# Patient Record
Sex: FEMALE | Race: WHITE | NOT HISPANIC OR LATINO | Employment: PART TIME | RURAL
[De-identification: names, ages, dates, MRNs, and addresses within clinical notes are randomized per-mention and may not be internally consistent; named-entity substitution may affect disease eponyms.]

---

## 2019-06-21 RX ORDER — EPINEPHRINE 0.3 MG/.3ML
1 INJECTION SUBCUTANEOUS AS NEEDED
COMMUNITY
Start: 2015-11-23 | End: 2019-06-21 | Stop reason: SDUPTHER

## 2019-06-21 RX ORDER — EPINEPHRINE 0.3 MG/.3ML
0.3 INJECTION SUBCUTANEOUS AS NEEDED
Qty: 2 EACH | Refills: 0 | Status: SHIPPED | OUTPATIENT
Start: 2019-06-21 | End: 2022-02-01 | Stop reason: SDUPTHER

## 2019-06-27 ENCOUNTER — TELEPHONE (OUTPATIENT)
Dept: FAMILY MEDICINE CLINIC | Facility: CLINIC | Age: 64
End: 2019-06-27

## 2019-06-27 DIAGNOSIS — G89.29 CHRONIC PAIN OF LEFT KNEE: Primary | ICD-10-CM

## 2019-06-27 DIAGNOSIS — Z13.820 SCREENING FOR OSTEOPOROSIS: ICD-10-CM

## 2019-06-27 DIAGNOSIS — M25.562 CHRONIC PAIN OF LEFT KNEE: Primary | ICD-10-CM

## 2019-06-27 NOTE — TELEPHONE ENCOUNTER
Patient spoke to her insurance like you asked about the Dexa and Left knee xray. She said that insurance would pay. She would like for you to order these.

## 2019-07-02 ENCOUNTER — HOSPITAL ENCOUNTER (OUTPATIENT)
Dept: BONE DENSITY | Facility: HOSPITAL | Age: 64
Discharge: HOME | End: 2019-07-02
Admitting: NURSE PRACTITIONER

## 2019-07-02 ENCOUNTER — HOSPITAL ENCOUNTER (OUTPATIENT)
Dept: GENERAL RADIOLOGY | Facility: HOSPITAL | Age: 64
Discharge: HOME | End: 2019-07-02

## 2019-07-02 DIAGNOSIS — G89.29 CHRONIC PAIN OF LEFT KNEE: ICD-10-CM

## 2019-07-02 DIAGNOSIS — M25.562 CHRONIC PAIN OF LEFT KNEE: ICD-10-CM

## 2019-07-02 PROCEDURE — 73560 X-RAY EXAM OF KNEE 1 OR 2: CPT

## 2019-07-02 PROCEDURE — 77080 DXA BONE DENSITY AXIAL: CPT

## 2020-02-12 RX ORDER — ENALAPRIL MALEATE AND HYDROCHLOROTHIAZIDE 5; 12.5 MG/1; MG/1
TABLET ORAL
Qty: 30 TABLET | Refills: 1 | Status: SHIPPED | OUTPATIENT
Start: 2020-02-12 | End: 2020-03-24

## 2020-02-17 PROBLEM — L71.9 ROSACEA: Status: ACTIVE | Noted: 2020-02-17

## 2020-03-12 ENCOUNTER — TELEPHONE (OUTPATIENT)
Dept: FAMILY MEDICINE CLINIC | Facility: CLINIC | Age: 65
End: 2020-03-12

## 2020-03-12 RX ORDER — AMOXICILLIN AND CLAVULANATE POTASSIUM 875; 125 MG/1; MG/1
1 TABLET, FILM COATED ORAL 2 TIMES DAILY
Qty: 20 TABLET | Refills: 0 | Status: SHIPPED | OUTPATIENT
Start: 2020-03-12 | End: 2020-03-22

## 2020-03-12 NOTE — TELEPHONE ENCOUNTER
Patient has had a sinus infection for 5 days and right side ear pain. She is requesting an ABX so she dose not have to get out around sick people. Please advise?

## 2020-03-24 RX ORDER — ENALAPRIL MALEATE AND HYDROCHLOROTHIAZIDE 5; 12.5 MG/1; MG/1
TABLET ORAL
Qty: 30 TABLET | Refills: 0 | Status: SHIPPED | OUTPATIENT
Start: 2020-03-24 | End: 2020-05-11

## 2020-03-27 PROBLEM — D25.9 FIBROID UTERUS: Status: ACTIVE | Noted: 2020-03-27

## 2020-03-27 PROBLEM — J45.909 ASTHMA: Status: ACTIVE | Noted: 2018-01-19

## 2020-03-27 PROBLEM — Z87.891 HISTORY OF TOBACCO USE: Status: ACTIVE | Noted: 2020-03-27

## 2020-03-27 PROBLEM — G43.909 MIGRAINE HEADACHE: Status: ACTIVE | Noted: 2019-03-13

## 2020-03-27 PROBLEM — K21.9 GASTROESOPHAGEAL REFLUX DISEASE: Status: ACTIVE | Noted: 2020-03-27

## 2020-03-27 PROBLEM — R73.01 IMPAIRED FASTING GLUCOSE: Status: ACTIVE | Noted: 2019-03-14

## 2020-03-27 PROBLEM — J30.9 ALLERGIC RHINITIS: Status: ACTIVE | Noted: 2020-03-27

## 2020-03-27 PROBLEM — Z78.0 MENOPAUSE: Status: ACTIVE | Noted: 2020-03-27

## 2020-03-27 PROBLEM — I10 HYPERTENSION, BENIGN: Status: ACTIVE | Noted: 2020-03-27

## 2020-03-27 RX ORDER — FAMOTIDINE 40 MG/1
40 TABLET, FILM COATED ORAL NIGHTLY PRN
COMMUNITY
Start: 2018-01-11

## 2020-03-27 RX ORDER — LANSOPRAZOLE 15 MG/1
CAPSULE, DELAYED RELEASE ORAL DAILY
COMMUNITY
Start: 2013-10-03 | End: 2020-06-03

## 2020-03-27 RX ORDER — FEXOFENADINE HCL 180 MG/1
TABLET ORAL EVERY 24 HOURS
COMMUNITY
Start: 2017-12-07 | End: 2020-06-03

## 2020-03-27 RX ORDER — ALBUTEROL SULFATE 90 UG/1
AEROSOL, METERED RESPIRATORY (INHALATION)
COMMUNITY
Start: 2012-05-21 | End: 2021-03-20

## 2020-03-27 RX ORDER — TRIAMCINOLONE ACETONIDE 55 UG/1
SPRAY, METERED NASAL
COMMUNITY
Start: 2016-12-06 | End: 2020-06-03

## 2020-03-27 RX ORDER — LORATADINE 10 MG/1
1 CAPSULE, LIQUID FILLED ORAL
COMMUNITY

## 2020-03-27 RX ORDER — OMEGA-3/DHA/EPA/FISH OIL 60 MG-90MG
1 CAPSULE ORAL EVERY 24 HOURS
COMMUNITY
Start: 2016-12-08

## 2020-03-27 RX ORDER — ASPIRIN 81 MG/1
TABLET ORAL
COMMUNITY
Start: 2017-12-07 | End: 2023-02-02

## 2020-03-27 RX ORDER — MONTELUKAST SODIUM 10 MG/1
TABLET ORAL EVERY 24 HOURS
COMMUNITY
Start: 2019-03-13 | End: 2020-05-28

## 2020-03-30 ENCOUNTER — TELEPHONE (OUTPATIENT)
Dept: FAMILY MEDICINE CLINIC | Facility: CLINIC | Age: 65
End: 2020-03-30

## 2020-03-30 NOTE — TELEPHONE ENCOUNTER
Please contact patient about her 4/1/20 appointment. I would recommend that we reschedule the physical for sometime in May. If she is having any problems we could set that up as a video visit in the mean time. I can send in any refills she needs to cover her until her appointment in May.

## 2020-03-31 NOTE — TELEPHONE ENCOUNTER
No. She would have to go to Urgent Care or the ER depending how bad her symptoms were. They would make the decision about what type of treatment would be recommended.

## 2020-03-31 NOTE — TELEPHONE ENCOUNTER
appt moved to June.   If she does get symptoms of the Covid 19 would you order the zpak and the malaria medication?

## 2020-05-11 RX ORDER — ENALAPRIL MALEATE AND HYDROCHLOROTHIAZIDE 5; 12.5 MG/1; MG/1
TABLET ORAL
Qty: 30 TABLET | Refills: 0 | Status: SHIPPED | OUTPATIENT
Start: 2020-05-11 | End: 2020-06-08

## 2020-05-28 RX ORDER — MONTELUKAST SODIUM 10 MG/1
TABLET ORAL
Qty: 90 TABLET | Refills: 0 | Status: SHIPPED | OUTPATIENT
Start: 2020-05-28 | End: 2020-08-21

## 2020-06-03 ENCOUNTER — OFFICE VISIT (OUTPATIENT)
Dept: FAMILY MEDICINE CLINIC | Facility: CLINIC | Age: 65
End: 2020-06-03

## 2020-06-03 VITALS
SYSTOLIC BLOOD PRESSURE: 152 MMHG | TEMPERATURE: 98.6 F | DIASTOLIC BLOOD PRESSURE: 84 MMHG | HEIGHT: 66 IN | OXYGEN SATURATION: 95 % | HEART RATE: 96 BPM | WEIGHT: 227 LBS | RESPIRATION RATE: 16 BRPM | BODY MASS INDEX: 36.48 KG/M2

## 2020-06-03 DIAGNOSIS — Z13.220 ENCOUNTER FOR LIPID SCREENING FOR CARDIOVASCULAR DISEASE: ICD-10-CM

## 2020-06-03 DIAGNOSIS — Z11.4 SCREENING FOR HUMAN IMMUNODEFICIENCY VIRUS: ICD-10-CM

## 2020-06-03 DIAGNOSIS — M15.9 PRIMARY OSTEOARTHRITIS INVOLVING MULTIPLE JOINTS: ICD-10-CM

## 2020-06-03 DIAGNOSIS — R31.1 BENIGN ESSENTIAL MICROSCOPIC HEMATURIA: ICD-10-CM

## 2020-06-03 DIAGNOSIS — Z72.89 OTHER PROBLEMS RELATED TO LIFESTYLE: ICD-10-CM

## 2020-06-03 DIAGNOSIS — I10 HYPERTENSION, BENIGN: ICD-10-CM

## 2020-06-03 DIAGNOSIS — Z00.01 ENCOUNTER FOR GENERAL ADULT MEDICAL EXAMINATION WITH ABNORMAL FINDINGS: Primary | ICD-10-CM

## 2020-06-03 DIAGNOSIS — Z13.6 ENCOUNTER FOR LIPID SCREENING FOR CARDIOVASCULAR DISEASE: ICD-10-CM

## 2020-06-03 DIAGNOSIS — E66.01 CLASS 2 SEVERE OBESITY DUE TO EXCESS CALORIES WITH SERIOUS COMORBIDITY AND BODY MASS INDEX (BMI) OF 37.0 TO 37.9 IN ADULT (HCC): ICD-10-CM

## 2020-06-03 DIAGNOSIS — G43.709 CHRONIC MIGRAINE WITHOUT AURA WITHOUT STATUS MIGRAINOSUS, NOT INTRACTABLE: ICD-10-CM

## 2020-06-03 DIAGNOSIS — E78.2 MIXED HYPERLIPIDEMIA: ICD-10-CM

## 2020-06-03 DIAGNOSIS — Z78.0 MENOPAUSE: ICD-10-CM

## 2020-06-03 DIAGNOSIS — Z13.6 SCREENING FOR CARDIOVASCULAR CONDITION: ICD-10-CM

## 2020-06-03 DIAGNOSIS — Z12.11 SCREENING FOR COLON CANCER: ICD-10-CM

## 2020-06-03 DIAGNOSIS — R73.01 IMPAIRED FASTING GLUCOSE: ICD-10-CM

## 2020-06-03 DIAGNOSIS — Z12.31 BREAST CANCER SCREENING BY MAMMOGRAM: ICD-10-CM

## 2020-06-03 DIAGNOSIS — J45.20 MILD INTERMITTENT ASTHMA WITHOUT COMPLICATION: ICD-10-CM

## 2020-06-03 DIAGNOSIS — K21.9 GASTROESOPHAGEAL REFLUX DISEASE, ESOPHAGITIS PRESENCE NOT SPECIFIED: ICD-10-CM

## 2020-06-03 DIAGNOSIS — J30.9 ALLERGIC RHINITIS, UNSPECIFIED SEASONALITY, UNSPECIFIED TRIGGER: ICD-10-CM

## 2020-06-03 DIAGNOSIS — Z12.72 SCREENING FOR VAGINAL CANCER: ICD-10-CM

## 2020-06-03 PROBLEM — E66.812 CLASS 2 SEVERE OBESITY DUE TO EXCESS CALORIES WITH SERIOUS COMORBIDITY AND BODY MASS INDEX (BMI) OF 37.0 TO 37.9 IN ADULT: Status: ACTIVE | Noted: 2020-06-03

## 2020-06-03 LAB
BILIRUB BLD-MCNC: NEGATIVE MG/DL
CLARITY, POC: CLEAR
COLOR UR: YELLOW
GLUCOSE UR STRIP-MCNC: NEGATIVE MG/DL
KETONES UR QL: NEGATIVE
LEUKOCYTE EST, POC: NEGATIVE
NITRITE UR-MCNC: NEGATIVE MG/ML
PH UR: 6 [PH] (ref 5–8)
PROT UR STRIP-MCNC: NEGATIVE MG/DL
RBC # UR STRIP: ABNORMAL /UL
SP GR UR: 1.02 (ref 1–1.03)
UROBILINOGEN UR QL: NORMAL

## 2020-06-03 PROCEDURE — G0101 CA SCREEN;PELVIC/BREAST EXAM: HCPCS | Performed by: NURSE PRACTITIONER

## 2020-06-03 PROCEDURE — G0402 INITIAL PREVENTIVE EXAM: HCPCS | Performed by: NURSE PRACTITIONER

## 2020-06-03 PROCEDURE — 99214 OFFICE O/P EST MOD 30 MIN: CPT | Performed by: NURSE PRACTITIONER

## 2020-06-03 PROCEDURE — G0403 EKG FOR INITIAL PREVENT EXAM: HCPCS | Performed by: NURSE PRACTITIONER

## 2020-06-03 PROCEDURE — 81003 URINALYSIS AUTO W/O SCOPE: CPT | Performed by: NURSE PRACTITIONER

## 2020-06-03 RX ORDER — NAPROXEN SODIUM 220 MG
220 TABLET ORAL 2 TIMES DAILY PRN
COMMUNITY
End: 2021-09-12

## 2020-06-03 NOTE — PROGRESS NOTES
Medicare Annual Wellness Visit  Chief Complaint   Patient presents with   • Hypertension   • Hyperlipidemia   • Asthma   • Welcome To Medicare   • Heartburn       Subjective     Alla Cole is a 65 y.o. female who presents for an Annual Wellness Visit. In addition, we addressed the following health issues:    Hypertension   This is a chronic problem. The problem is unchanged. The problem is uncontrolled. Pertinent negatives include no blurred vision, chest pain, neck pain, palpitations or shortness of breath. Past treatments include ACE inhibitors and diuretics. Current antihypertension treatment includes ACE inhibitors and diuretics. The current treatment provides moderate improvement.   Hyperlipidemia   This is a chronic problem. The problem is uncontrolled. Recent lipid tests were reviewed and are variable. Exacerbating diseases include obesity. Pertinent negatives include no chest pain, myalgias or shortness of breath. Treatments tried: fish oil. The current treatment provides moderate improvement of lipids. Risk factors for coronary artery disease include dyslipidemia, hypertension, obesity, post-menopausal, a sedentary lifestyle and family history.   Asthma   There is no cough, shortness of breath or wheezing. This is a chronic problem. The current episode started more than 1 year ago. The problem occurs rarely. The problem has been gradually improving. Pertinent negatives include no appetite change, chest pain, ear pain, fever, heartburn, myalgias, postnasal drip, rhinorrhea, sneezing, sore throat, trouble swallowing or weight loss. Her symptoms are aggravated by pollen. Her symptoms are alleviated by beta-agonist. She reports significant improvement on treatment. Her symptoms are not alleviated by OTC inhaler. Her past medical history is significant for asthma.   Heartburn   She reports no abdominal pain, no chest pain, no choking, no coughing, no early satiety, no globus sensation, no heartburn, no  nausea, no sore throat or no wheezing. This is a chronic problem. The current episode started more than 1 year ago. The problem occurs rarely. The problem has been gradually improving. The symptoms are aggravated by certain foods. Pertinent negatives include no fatigue or weight loss. She has tried a histamine-2 antagonist for the symptoms. The treatment provided significant relief.       Medications    Current Outpatient Medications:   •  albuterol sulfate HFA (ProAir HFA) 108 (90 Base) MCG/ACT inhaler, PROAIR  (90 Base) MCG/ACT AERS, Disp: , Rfl:   •  aspirin (Liz Low Dose) 81 MG EC tablet, LIZ LOW DOSE 81 MG TBEC, Disp: , Rfl:   •  Enalapril-hydroCHLOROthiazide 5-12.5 MG per tablet, TAKE 1 TABLET BY MOUTH EVERY DAY, Disp: 30 tablet, Rfl: 0  •  EPINEPHrine (EPIPEN 2-FAWN) 0.3 MG/0.3ML solution auto-injector injection, Inject 0.3 mL into the appropriate muscle as directed by prescriber As Needed (as needed for anaphylaxis)., Disp: 2 each, Rfl: 0  •  famotidine (PEPCID) 40 MG tablet, Daily., Disp: , Rfl:   •  fluticasone (Veramyst) 27.5 MCG/SPRAY nasal spray, into the nostril(s) as directed by provider., Disp: , Rfl:   •  Loratadine (Claritin) 10 MG capsule, Take 1 capsule by mouth., Disp: , Rfl:   •  montelukast (SINGULAIR) 10 MG tablet, TAKE 1 TABLET BY MOUTH EVERY EVENING FOR ALLERGIES, Disp: 90 tablet, Rfl: 0  •  naproxen sodium (ALEVE) 220 MG tablet, Take 220 mg by mouth 2 (Two) Times a Day As Needed., Disp: , Rfl:   •  Omega-3 Fatty Acids (FISH OIL) 500 MG capsule capsule, 1 capsule Daily., Disp: , Rfl:      Problem List  Patient Active Problem List   Diagnosis   • Rosacea   • Allergic rhinitis   • Asthma   • Endometrial hyperplasia with atypia   • Fibroid uterus   • Gastroesophageal reflux disease   • Hearing loss of right ear   • History of Guillain-[place] syndrome   • History of tobacco use   • Hyperlipidemia   • Hypertension, benign   • Impaired fasting glucose   • Menopause   • Migraine headache    • Osteoarthritis   • Class 2 severe obesity due to excess calories with serious comorbidity and body mass index (BMI) of 37.0 to 37.9 in adult (CMS/[facility])   • Benign essential microscopic hematuria       Surgical History  Past Surgical History:   Procedure Laterality Date   • BREAST BIOPSY Right     benign   • CHOLECYSTECTOMY  12/2014   • D&C HYSTEROSCOPY  10/01/2015    Dr. Bell   • DIAGNOSTIC LAPAROSCOPY      Dr. Bell   • HYSTEROSCOPY  10/01/2015    Dr. Bell   • ORIF WRIST FRACTURE Right    • RIGHT OOPHORECTOMY  2003    Dr. Bell   • TOTAL LAPAROSCOPIC HYSTERECTOMY WITH DAVINCI ROBOT  12/01/2015    Dr. Bell and Dr. Ott   • TYMPANOPLASTY W/ MASTOIDECTOMY          Social History  Social History     Socioeconomic History   • Marital status:      Spouse name: Not on file   • Number of children: Not on file   • Years of education: Not on file   • Highest education level: Not on file   Tobacco Use   • Smoking status: Former Smoker     Types: Cigarettes   • Smokeless tobacco: Never Used   Substance and Sexual Activity   • Alcohol use: Never     Frequency: Never   • Drug use: Never        Family History  Family History   Problem Relation Age of Onset   • Hypertension Mother    • Thyroid disease Mother    • Alcohol abuse Mother    • Heart disease Mother    • Stroke Father    • Brain cancer Father    • Heart disease Father    • Thyroid disease Maternal Grandmother    • Stroke Paternal Grandmother         Health Risk Assessment:  The patient has completed a Health Risk Assessment and it has been reviewed.    Current Medical Providers:  Patient Care Team:  Bárbara Marie APRN as PCP - General  Bárbara Marie APRN as PCP - Family Medicine  Severtson, Mark A, MD as Consulting Physician (Otolaryngology)    Age-appropriate Screening Schedule:  Refer to the list below for future screening recommendations based on patient's age. Orders for these recommended tests are listed in the plan section. The patient has been  provided with a written plan.    Health Maintenance   Topic Date Due   • TDAP/TD VACCINES (1 - Tdap) 04/21/1966   • ZOSTER VACCINE (1 of 2) 04/21/2005   • HEPATITIS C SCREENING  06/21/2019   • LIPID PANEL  03/27/2020   • Pneumococcal Vaccine Once at 65 Years Old  04/21/2020   • INFLUENZA VACCINE  08/01/2020   • COLONOSCOPY  11/05/2020   • MAMMOGRAM  03/29/2021   • MEDICARE ANNUAL WELLNESS  06/03/2021       Depression Screen:   PHQ-2/PHQ-9 Depression Screening 6/3/2020   Little interest or pleasure in doing things 0   Feeling down, depressed, or hopeless 0   Total Score 0       Functional and Cognitive Screening:  Functional & Cognitive Status 6/3/2020   Do you have difficulty preparing food and eating? No   Do you have difficulty bathing yourself, getting dressed or grooming yourself? No   Do you have difficulty using the toilet? No   Do you have difficulty moving around from place to place? No   Do you have trouble with steps or getting out of a bed or a chair? No   Current Diet Well Balanced Diet   Dental Exam Up to date   Eye Exam Up to date   Exercise (times per week) 5 times per week   Current Exercise Activities Include Walking   Do you need help using the phone?  No   Are you deaf or do you have serious difficulty hearing?  No   Do you need help with transportation? No   Do you need help shopping? No   Do you need help preparing meals?  No   Do you need help with housework?  No   Do you need help with laundry? No   Do you need help taking your medications? No   Do you need help managing money? No   Do you ever drive or ride in a car without wearing a seat belt? No   Have you felt unusual stress, anger or loneliness in the last month? No   Who do you live with? Spouse   If you need help, do you have trouble finding someone available to you? No   Have you been bothered in the last four weeks by sexual problems? No   Do you have difficulty concentrating, remembering or making decisions? No     Does the patient  have evidence of cognitive impairment? No    ATTENTION  What is the year: correct (20)  What is the month of the year: correct (20)  What is the day of the week?: correct (20)  What is the date?: correct (20)  MEMORY  Repeat address three times, only score third attempt: [address], [place]: 7 (20)  HOW MANY ANIMALS DID THE PATIENT NAME  Verbal Fluency -- Animal Names (0-25): 22+ (20)  CLOCK DRAWING  Clock Drawing: All Correct (20)  MEMORY RECALL  Tell me what you remember about that name and address we were repeating at the beginnin (20)  ACE TOTAL SCORE  Total ACE Score - <25/30 strongly suggests cognitive impairment; <21/30 almost certainly shows dementia: 30 (20)    Advanced Care Planning:  ACP discussion was held with the patient during this visit. Patient has an advance directive in EMR which is still valid.     Identification of Risk Factors:  Risk factors include: Abdominal Aortic Aneurysm Screening  Advance Directive Discussion  Breast Cancer/Mammogram Screening  Cardiovascular risk  Colon Cancer Screening  Hearing Problem.    Review of Systems   Constitutional: Negative for appetite change, chills, diaphoresis, fatigue, fever, unexpected weight gain and unexpected weight loss.   HENT: Positive for hearing loss. Negative for congestion, ear discharge, ear pain, mouth sores, nosebleeds, postnasal drip, rhinorrhea, sinus pressure, sneezing, sore throat, swollen glands and trouble swallowing.    Eyes: Negative for blurred vision, double vision, pain, discharge, redness and itching.   Respiratory: Negative for apnea, cough, choking, shortness of breath, wheezing and stridor.    Cardiovascular: Negative for chest pain, palpitations and leg swelling.   Gastrointestinal: Negative for abdominal distention, abdominal pain, anal bleeding, blood in stool, constipation, diarrhea, nausea,  "rectal pain, vomiting, GERD and indigestion.   Endocrine: Negative for cold intolerance, heat intolerance, polydipsia, polyphagia and polyuria.   Genitourinary: Negative for breast discharge, breast lump, breast pain, difficulty urinating, dysuria, flank pain, frequency, genital sores, hematuria, pelvic pain, urgency, urinary incontinence, vaginal bleeding, vaginal discharge and vaginal pain.   Musculoskeletal: Positive for arthralgias. Negative for back pain, gait problem, joint swelling, myalgias, neck pain and neck stiffness.        Knees   Skin: Negative for color change, rash and skin lesions.   Allergic/Immunologic: Positive for environmental allergies.   Neurological: Negative for dizziness, tremors, seizures, syncope, speech difficulty, weakness, light-headedness, numbness, headache, memory problem and confusion.   Hematological: Negative for adenopathy. Does not bruise/bleed easily.   Psychiatric/Behavioral: Negative for self-injury, sleep disturbance, suicidal ideas, depressed mood and stress. The patient is not nervous/anxious.        Objective     Vitals:    06/03/20 1410 06/03/20 1515   BP: 157/87 152/84   BP Location: Right arm Left arm   Patient Position: Sitting Lying   Cuff Size: Large Adult Large Adult   Pulse: 96    Resp: 16    Temp: 98.6 °F (37 °C)    TempSrc: Temporal    SpO2: 95%    Weight: 103 kg (227 lb)    Height: 166.4 cm (65.5\")          06/03/20  1410   Weight: 103 kg (227 lb)     Body mass index is 37.2 kg/m².    Physical Exam   Constitutional: She is oriented to person, place, and time. She appears well-developed and well-nourished. No distress.   HENT:   Head: Normocephalic and atraumatic.   Right Ear: External ear and ear canal normal. Tympanic membrane is scarred. Tympanic membrane is not injected, not erythematous and not retracted.   Left Ear: Tympanic membrane, external ear and ear canal normal. Tympanic membrane is not injected, not erythematous and not retracted.   Nose: Nose " normal. No mucosal edema or rhinorrhea. Right sinus exhibits no maxillary sinus tenderness and no frontal sinus tenderness. Left sinus exhibits no maxillary sinus tenderness and no frontal sinus tenderness.   Mouth/Throat: Uvula is midline, oropharynx is clear and moist and mucous membranes are normal. No oral lesions. No oropharyngeal exudate.   Eyes: Pupils are equal, round, and reactive to light. Conjunctivae, EOM and lids are normal. Right eye exhibits no discharge. Left eye exhibits no discharge.   Neck: Normal range of motion. Neck supple. No JVD present. Carotid bruit is not present. No tracheal deviation present. No thyroid mass and no thyromegaly present.   Cardiovascular: Normal rate, regular rhythm, normal heart sounds and intact distal pulses. Exam reveals no gallop and no friction rub.   No murmur heard.  Pulmonary/Chest: Effort normal and breath sounds normal. No respiratory distress. She has no wheezes. She has no rales. Right breast exhibits no inverted nipple, no mass, no nipple discharge, no skin change and no tenderness. Left breast exhibits no inverted nipple, no mass, no nipple discharge, no skin change and no tenderness. Breasts are symmetrical.   Abdominal: Soft. Bowel sounds are normal. She exhibits no distension and no mass. There is no hepatosplenomegaly. There is no tenderness. No hernia.   Genitourinary: Rectum normal and vagina normal. Pelvic exam was performed with patient supine. There is no rash, tenderness or lesion on the right labia. There is no rash, tenderness or lesion on the left labia. Right adnexum displays no mass, no tenderness and no fullness. Left adnexum displays no mass, no tenderness and no fullness. No vaginal discharge found.   Genitourinary Comments: Cervix & uterus are surgically absent.   Musculoskeletal: Normal range of motion. She exhibits no edema or deformity.   Lymphadenopathy:     She has no cervical adenopathy.     She has no axillary adenopathy. No  inguinal adenopathy noted on the right or left side.   Neurological: She is alert and oriented to person, place, and time. She has normal strength and normal reflexes. No cranial nerve deficit or sensory deficit. Gait normal.   Skin: Skin is warm and dry. No lesion and no rash noted. She is not diaphoretic.   Psychiatric: She has a normal mood and affect. Her speech is normal and behavior is normal. Judgment and thought content normal. Cognition and memory are normal.   Vitals reviewed.      ECG 12 Lead  Date/Time: 6/3/2020 3:22 PM  Performed by: Bárbara Marie APRN  Authorized by: Bárbara Marie APRN   Comparison: compared with previous ECG from 2/19/2019  Similar to previous ECG  Rhythm: sinus rhythm  Rate: normal  BPM: 86  Conduction: conduction normal  ST Segments: ST segments normal  T Waves: T waves normal  QRS axis: normal  Other: no other findings    Clinical impression: normal ECG              Office Visit on 06/03/2020   Component Date Value Ref Range Status   • Color 06/03/2020 Yellow  Yellow, Straw, Dark Yellow, Jazz Final   • Clarity, UA 06/03/2020 Clear  Clear Final   • Specific Gravity  06/03/2020 1.020  1.005 - 1.030 Final   • pH, Urine 06/03/2020 6.0  5.0 - 8.0 Final   • Leukocytes 06/03/2020 Negative  Negative Final   • Nitrite, UA 06/03/2020 Negative  Negative Final   • Protein, POC 06/03/2020 Negative  Negative mg/dL Final   • Glucose, UA 06/03/2020 Negative  Negative, 1000 mg/dL (3+) mg/dL Final   • Ketones, UA 06/03/2020 Negative  Negative Final   • Urobilinogen, UA 06/03/2020 Normal  Normal Final   • Bilirubin 06/03/2020 Negative  Negative Final   • Blood, UA 06/03/2020 Trace* Negative Final   ]    Assessment/Plan   Patient Self-Management and Personalized Health Advice  The patient has been provided with information about: diet, exercise, weight management, prevention of cardiac or vascular disease, the relationship between weight and GERD and designing advance directives and preventive  services including:   · Annual Wellness Visit (AWV)  · Bone Density Measurements  · Cardiovascular Disease Screening Tests (may do this order every 5 years in beneficiaries without signs or symptoms of cardiovascular disease)  · Colorectal Cancer Screening, Colonoscopy  · Diabetes Self-Management Training (DSMT)   · Glaucoma screening (for individuals with diabetes mellitus, family history of glaucoma, -Americans (> or =) age 50, -Americans (> or =) age 65)  · Hepatitis C Virus Screening (beneficiaries must fall into one of the following categories to be eligible- high risk for HCV infection, born between 1231-8180, or history of blood transfusion before 1992)  · Human Immunodeficiency Virus (HIV) Screening (Medicare Preventive Educational Tool used for proper ICD-10 diagnosis codes)  · Influenza Vaccine and Administration  · Intensive Behavioral Therapy (IBT) for Cardiovascular Disease (CVD) (15 minutes counseling time, Code )  · Intensive Behavioral Therapy (IBT) for Obesity (15 minutes counseling time, Code ); Medicare covers benificaries if BMI is > or = 30, beneficiary is competent and alert at the time of counseling, and counseling is furnished by primary care provider in a primary care setting  · Pneumococcal Vaccine and Administration  · Screening Mammography   · Screening Pelvic Examinations (Includes a clinical breast examination).    Discussed the patient's BMI with her. The BMI is above average; BMI management plan is completed.    Diagnoses and all orders for this visit:    1. Encounter for general adult medical examination with abnormal findings (Primary)  Comments:  Discussed age appropriate health maintenance. She is reluctant to take immunizations due to her hx of GBS.     2. Hypertension, benign  Assessment & Plan:  Elevated today. She reports normal readings at home.   Follow-up in 1 month.     Orders:  -     POC Urinalysis Dipstick, Automated  -     CBC & Differential  -      Comprehensive Metabolic Panel    3. Impaired fasting glucose  Assessment & Plan:  Encouraged healthy diet, regular exercise and weight loss.   Will call with lab results and further recommendations.   She declines Prediabetes Classes at this time.     Orders:  -     Hemoglobin A1c    4. Mixed hyperlipidemia  Assessment & Plan:  Encouraged healthy diet, regular exercise, weight loss.   Will call with lab results and further recommendations.       5. Chronic migraine without aura without status migrainosus, not intractable  Assessment & Plan:  No recent problems.         6. Allergic rhinitis, unspecified seasonality, unspecified trigger  Assessment & Plan:  Stable.       7. Mild intermittent asthma without complication  Assessment & Plan:  She has not needed her rescue inhaler at all in the last year.   Primarily triggered by allergies, which have been controlled.         8. Gastroesophageal reflux disease, esophagitis presence not specified  Assessment & Plan:  Controlled with Pepcid use.       9. Primary osteoarthritis involving multiple joints  Assessment & Plan:  Controlled with PRN use of OTC analgesic and NSAIDs.      10. Menopause  Assessment & Plan:  Normal DEXA in 2019.  Repeat DEXA in 2021.   Encouraged weight bearing exercise and increased calcium intake.       11. Benign essential microscopic hematuria  Assessment & Plan:  Past evaluation by Urology. No worsening.       12. Class 2 severe obesity due to excess calories with serious comorbidity and body mass index (BMI) of 37.0 to 37.9 in adult (CMS/[facility])  Comments:  She declines obesity counseling at this time.     13. Encounter for lipid screening for cardiovascular disease  -     Lipid Panel    14. Screening for human immunodeficiency virus  -     HIV-1 / O / 2 Ag / Antibody 4th Generation    15. Screening for cardiovascular condition  -     ECG 12 Lead    16. Screening for vaginal cancer    17. Screening for colon cancer  Comments:  Given GSI packet.  She will schedule colonoscopy for later this year or early next year.   Orders:  -     Ambulatory Referral For Screening Colonoscopy    18. Breast cancer screening by mammogram  -     Mammo Screening Digital Tomosynthesis Bilateral With CAD; Future    19. Other problems related to lifestyle  -     Hepatitis C Antibody      Orders:  Orders Placed This Encounter   Procedures   • Mammo Screening Digital Tomosynthesis Bilateral With CAD   • Comprehensive Metabolic Panel   • Hepatitis C Antibody   • HIV-1 / O / 2 Ag / Antibody 4th Generation   • Lipid Panel   • Hemoglobin A1c   • Ambulatory Referral For Screening Colonoscopy   • POC Urinalysis Dipstick, Automated   • ECG 12 Lead   • CBC & Differential       Follow Up:  Return in about 1 month (around 7/3/2020) for Follow-Up hematuria, pap, hypertension.     An After Visit Summary and PPPS with all of these plans were given to the patient.

## 2020-06-04 ENCOUNTER — TELEPHONE (OUTPATIENT)
Dept: FAMILY MEDICINE CLINIC | Facility: CLINIC | Age: 65
End: 2020-06-04

## 2020-06-04 LAB
ALBUMIN SERPL-MCNC: 4.6 G/DL (ref 3.8–4.8)
ALBUMIN/GLOB SERPL: 2 {RATIO} (ref 1.2–2.2)
ALP SERPL-CCNC: 62 IU/L (ref 39–117)
ALT SERPL-CCNC: 31 IU/L (ref 0–32)
AST SERPL-CCNC: 22 IU/L (ref 0–40)
BASOPHILS # BLD AUTO: 0.1 X10E3/UL (ref 0–0.2)
BASOPHILS NFR BLD AUTO: 1 %
BILIRUB SERPL-MCNC: 0.5 MG/DL (ref 0–1.2)
BUN SERPL-MCNC: 12 MG/DL (ref 8–27)
BUN/CREAT SERPL: 18 (ref 12–28)
CALCIUM SERPL-MCNC: 9.9 MG/DL (ref 8.7–10.3)
CHLORIDE SERPL-SCNC: 98 MMOL/L (ref 96–106)
CHOLEST SERPL-MCNC: 232 MG/DL (ref 100–199)
CO2 SERPL-SCNC: 26 MMOL/L (ref 20–29)
CREAT SERPL-MCNC: 0.68 MG/DL (ref 0.57–1)
EOSINOPHIL # BLD AUTO: 0.2 X10E3/UL (ref 0–0.4)
EOSINOPHIL NFR BLD AUTO: 3 %
ERYTHROCYTE [DISTWIDTH] IN BLOOD BY AUTOMATED COUNT: 13.1 % (ref 11.7–15.4)
GLOBULIN SER CALC-MCNC: 2.3 G/DL (ref 1.5–4.5)
GLUCOSE SERPL-MCNC: 87 MG/DL (ref 65–99)
HBA1C MFR BLD: 5.3 % (ref 4.8–5.6)
HCT VFR BLD AUTO: 44.5 % (ref 34–46.6)
HCV AB S/CO SERPL IA: <0.1 S/CO RATIO (ref 0–0.9)
HDLC SERPL-MCNC: 70 MG/DL
HGB BLD-MCNC: 15 G/DL (ref 11.1–15.9)
HIV 1+2 AB+HIV1 P24 AG SERPL QL IA: NON REACTIVE
IMM GRANULOCYTES # BLD AUTO: 0 X10E3/UL (ref 0–0.1)
IMM GRANULOCYTES NFR BLD AUTO: 0 %
LDLC SERPL CALC-MCNC: 136 MG/DL (ref 0–99)
LYMPHOCYTES # BLD AUTO: 1.7 X10E3/UL (ref 0.7–3.1)
LYMPHOCYTES NFR BLD AUTO: 23 %
MCH RBC QN AUTO: 30.5 PG (ref 26.6–33)
MCHC RBC AUTO-ENTMCNC: 33.7 G/DL (ref 31.5–35.7)
MCV RBC AUTO: 91 FL (ref 79–97)
MONOCYTES # BLD AUTO: 0.5 X10E3/UL (ref 0.1–0.9)
MONOCYTES NFR BLD AUTO: 7 %
NEUTROPHILS # BLD AUTO: 5 X10E3/UL (ref 1.4–7)
NEUTROPHILS NFR BLD AUTO: 66 %
PLATELET # BLD AUTO: 305 X10E3/UL (ref 150–450)
POTASSIUM SERPL-SCNC: 4.2 MMOL/L (ref 3.5–5.2)
PROT SERPL-MCNC: 6.9 G/DL (ref 6–8.5)
RBC # BLD AUTO: 4.91 X10E6/UL (ref 3.77–5.28)
SODIUM SERPL-SCNC: 141 MMOL/L (ref 134–144)
TRIGL SERPL-MCNC: 128 MG/DL (ref 0–149)
VLDLC SERPL CALC-MCNC: 26 MG/DL (ref 5–40)
WBC # BLD AUTO: 7.4 X10E3/UL (ref 3.4–10.8)

## 2020-06-04 NOTE — ASSESSMENT & PLAN NOTE
Normal DEXA in 2019.  Repeat DEXA in 2021.   Encouraged weight bearing exercise and increased calcium intake.

## 2020-06-04 NOTE — TELEPHONE ENCOUNTER
Let patient know that I reviewed immunization recommendations from the CDC for people that have had Guillain-[place] Syndrome. They recommend that she should not get the Tdap or Td vaccine (Tetanus), but the Hepatitis A vaccine, Pneumonia vaccines (Prevnar and Pneumovax) and the shingles vaccine (Shingrix) are okay to take.

## 2020-06-04 NOTE — ASSESSMENT & PLAN NOTE
Encouraged healthy diet, regular exercise and weight loss.   Will call with lab results and further recommendations.   She declines Prediabetes Classes at this time.

## 2020-06-04 NOTE — ASSESSMENT & PLAN NOTE
She has not needed her rescue inhaler at all in the last year.   Primarily triggered by allergies, which have been controlled.

## 2020-06-04 NOTE — ASSESSMENT & PLAN NOTE
Encouraged healthy diet, regular exercise, weight loss.   Will call with lab results and further recommendations.

## 2020-06-08 ENCOUNTER — TRANSCRIBE ORDERS (OUTPATIENT)
Dept: ADMINISTRATIVE | Facility: HOSPITAL | Age: 65
End: 2020-06-08

## 2020-06-08 DIAGNOSIS — Z12.31 SCREENING MAMMOGRAM FOR HIGH-RISK PATIENT: Primary | ICD-10-CM

## 2020-06-08 RX ORDER — ENALAPRIL MALEATE AND HYDROCHLOROTHIAZIDE 5; 12.5 MG/1; MG/1
TABLET ORAL
Qty: 30 TABLET | Refills: 1 | Status: SHIPPED | OUTPATIENT
Start: 2020-06-08 | End: 2020-08-13 | Stop reason: SDUPTHER

## 2020-06-16 ENCOUNTER — HOSPITAL ENCOUNTER (OUTPATIENT)
Dept: MAMMOGRAPHY | Facility: HOSPITAL | Age: 65
Discharge: HOME | End: 2020-06-16
Admitting: NURSE PRACTITIONER

## 2020-06-16 DIAGNOSIS — Z12.31 SCREENING MAMMOGRAM FOR HIGH-RISK PATIENT: ICD-10-CM

## 2020-06-16 PROCEDURE — 77063 BREAST TOMOSYNTHESIS BI: CPT

## 2020-06-16 PROCEDURE — 77067 SCR MAMMO BI INCL CAD: CPT

## 2020-08-11 RX ORDER — ENALAPRIL MALEATE AND HYDROCHLOROTHIAZIDE 5; 12.5 MG/1; MG/1
TABLET ORAL
Qty: 30 TABLET | Refills: 1 | OUTPATIENT
Start: 2020-08-11

## 2020-08-11 RX ORDER — ENALAPRIL MALEATE AND HYDROCHLOROTHIAZIDE 5; 12.5 MG/1; MG/1
1 TABLET ORAL DAILY
Qty: 30 TABLET | Refills: 1 | OUTPATIENT
Start: 2020-08-11

## 2020-08-13 RX ORDER — ENALAPRIL MALEATE AND HYDROCHLOROTHIAZIDE 5; 12.5 MG/1; MG/1
1 TABLET ORAL DAILY
Qty: 30 TABLET | Refills: 0 | Status: SHIPPED | OUTPATIENT
Start: 2020-08-13 | End: 2020-09-03 | Stop reason: SDUPTHER

## 2020-08-21 RX ORDER — MONTELUKAST SODIUM 10 MG/1
TABLET ORAL
Qty: 90 TABLET | Refills: 0 | Status: SHIPPED | OUTPATIENT
Start: 2020-08-21 | End: 2020-11-16

## 2020-09-01 ENCOUNTER — OFFICE VISIT (OUTPATIENT)
Dept: FAMILY MEDICINE CLINIC | Facility: CLINIC | Age: 65
End: 2020-09-01

## 2020-09-01 ENCOUNTER — HOSPITAL ENCOUNTER (OUTPATIENT)
Dept: GENERAL RADIOLOGY | Facility: HOSPITAL | Age: 65
Discharge: HOME | End: 2020-09-01
Admitting: NURSE PRACTITIONER

## 2020-09-01 ENCOUNTER — HOSPITAL ENCOUNTER (OUTPATIENT)
Dept: GENERAL RADIOLOGY | Facility: HOSPITAL | Age: 65
Discharge: HOME | End: 2020-09-01

## 2020-09-01 VITALS
OXYGEN SATURATION: 94 % | BODY MASS INDEX: 35.77 KG/M2 | DIASTOLIC BLOOD PRESSURE: 82 MMHG | SYSTOLIC BLOOD PRESSURE: 118 MMHG | TEMPERATURE: 98.3 F | HEIGHT: 66 IN | RESPIRATION RATE: 16 BRPM | WEIGHT: 222.6 LBS | HEART RATE: 87 BPM

## 2020-09-01 DIAGNOSIS — E66.01 CLASS 2 SEVERE OBESITY DUE TO EXCESS CALORIES WITH SERIOUS COMORBIDITY AND BODY MASS INDEX (BMI) OF 36.0 TO 36.9 IN ADULT (HCC): ICD-10-CM

## 2020-09-01 DIAGNOSIS — G89.29 CHRONIC PAIN OF BOTH KNEES: ICD-10-CM

## 2020-09-01 DIAGNOSIS — R31.1 BENIGN ESSENTIAL MICROSCOPIC HEMATURIA: ICD-10-CM

## 2020-09-01 DIAGNOSIS — M25.562 CHRONIC PAIN OF BOTH KNEES: ICD-10-CM

## 2020-09-01 DIAGNOSIS — M25.561 CHRONIC PAIN OF BOTH KNEES: ICD-10-CM

## 2020-09-01 DIAGNOSIS — I10 HYPERTENSION, BENIGN: Primary | ICD-10-CM

## 2020-09-01 DIAGNOSIS — M79.671 RIGHT FOOT PAIN: ICD-10-CM

## 2020-09-01 DIAGNOSIS — Z86.69 HISTORY OF GUILLAIN-BARRE SYNDROME: ICD-10-CM

## 2020-09-01 LAB
BILIRUB BLD-MCNC: NEGATIVE MG/DL
CLARITY, POC: CLEAR
COLOR UR: YELLOW
GLUCOSE UR STRIP-MCNC: NEGATIVE MG/DL
KETONES UR QL: NEGATIVE
LEUKOCYTE EST, POC: NEGATIVE
NITRITE UR-MCNC: NEGATIVE MG/ML
PH UR: 8.5 [PH] (ref 5–8)
PROT UR STRIP-MCNC: NEGATIVE MG/DL
RBC # UR STRIP: NEGATIVE /UL
SP GR UR: 1.02 (ref 1–1.03)
UROBILINOGEN UR QL: NORMAL

## 2020-09-01 PROCEDURE — 81003 URINALYSIS AUTO W/O SCOPE: CPT | Performed by: NURSE PRACTITIONER

## 2020-09-01 PROCEDURE — 73630 X-RAY EXAM OF FOOT: CPT

## 2020-09-01 PROCEDURE — 99213 OFFICE O/P EST LOW 20 MIN: CPT | Performed by: NURSE PRACTITIONER

## 2020-09-01 PROCEDURE — 73562 X-RAY EXAM OF KNEE 3: CPT

## 2020-09-01 RX ORDER — ZINC 25 MG
25 TABLET ORAL DAILY
COMMUNITY

## 2020-09-01 NOTE — ASSESSMENT & PLAN NOTE
Reviewed immunization recommendations and precautions from the CDC.   Tdap and TD are not recommended.   Influenza is at patient's discretion.   Recommended Pneumonia vaccines - she will think about this.

## 2020-09-01 NOTE — PROGRESS NOTES
Chief Complaint   Patient presents with   • Hypertension   • Blood in Urine   • Knee Pain     bilateral. x 1 year.         Subjective     Alla Cole  has a past medical history of Allergic rhinitis, Anaphylaxis, Asthma, Benign essential microscopic hematuria (6/3/2020), Chronic sinusitis, Colon polyp, GERD (gastroesophageal reflux disease), Hearing loss, Hypertension, Impaired fasting glucose, Microscopic hematuria, Migraine headache, Rosacea (2/17/2020), and Vitreous degeneration.    Hypertension   This is a chronic problem. The problem has been gradually improving since onset. The problem is controlled. Pertinent negatives include no blurred vision, chest pain, neck pain, palpitations or shortness of breath. Past treatments include ACE inhibitors and diuretics. Current antihypertension treatment includes ACE inhibitors and diuretics. The current treatment provides significant improvement.   Blood in Urine   This is a new problem. The current episode started more than 1 month ago. The problem has been gradually improving since onset. She describes the hematuria as microscopic hematuria. She reports no clotting in her urine stream. She is experiencing no pain. She describes her urine color as clear. Irritative symptoms do not include frequency. Pertinent negatives include no abdominal pain, dysuria, fever, flank pain, nausea or vomiting.   Knee Pain    The incident occurred more than 1 week ago. There was no injury mechanism. The pain is present in the left knee and right knee. The quality of the pain is described as aching. The pain is moderate. The pain has been worsening since onset. Pertinent negatives include no loss of motion, numbness or tingling. She reports no foreign bodies present. The symptoms are aggravated by weight bearing and movement.         Allergies   Allergen Reactions   • Sulfa Antibiotics Hives         Current Outpatient Medications:   •  albuterol sulfate HFA (ProAir HFA) 108 (90 Base)  MCG/ACT inhaler, PROAIR  (90 Base) MCG/ACT AERS, Disp: , Rfl:   •  aspirin (Liz Low Dose) 81 MG EC tablet, LIZ LOW DOSE 81 MG TBEC, Disp: , Rfl:   •  Enalapril-hydroCHLOROthiazide 5-12.5 MG per tablet, Take 1 tablet by mouth Daily., Disp: 30 tablet, Rfl: 0  •  EPINEPHrine (EPIPEN 2-FAWN) 0.3 MG/0.3ML solution auto-injector injection, Inject 0.3 mL into the appropriate muscle as directed by prescriber As Needed (as needed for anaphylaxis)., Disp: 2 each, Rfl: 0  •  famotidine (PEPCID) 40 MG tablet, Daily., Disp: , Rfl:   •  fluticasone (Veramyst) 27.5 MCG/SPRAY nasal spray, into the nostril(s) as directed by provider., Disp: , Rfl:   •  Loratadine (Claritin) 10 MG capsule, Take 1 capsule by mouth., Disp: , Rfl:   •  montelukast (SINGULAIR) 10 MG tablet, TAKE 1 TABLET BY MOUTH EVERY EVENING FOR ALLERGIES, Disp: 90 tablet, Rfl: 0  •  Multiple Vitamins-Minerals (ZINC PO), Take  by mouth., Disp: , Rfl:   •  naproxen sodium (ALEVE) 220 MG tablet, Take 220 mg by mouth 2 (Two) Times a Day As Needed., Disp: , Rfl:   •  Omega-3 Fatty Acids (FISH OIL) 500 MG capsule capsule, 1 capsule Daily., Disp: , Rfl:   •  Zinc 25 MG tablet, Take 25 mg by mouth Daily., Disp: , Rfl:     Patient Active Problem List   Diagnosis   • Rosacea   • Allergic rhinitis   • Asthma   • Endometrial hyperplasia with atypia   • Fibroid uterus   • Gastroesophageal reflux disease   • Hearing loss of right ear   • History of Guillain-[place] syndrome   • History of tobacco use   • Hyperlipidemia   • Hypertension, benign   • Impaired fasting glucose   • Menopause   • Migraine headache   • Osteoarthritis   • Class 2 severe obesity due to excess calories with serious comorbidity and body mass index (BMI) of 36.0 to 36.9 in adult (CMS/[facility])   • Benign essential microscopic hematuria        Past Surgical History:   Procedure Laterality Date   • BREAST BIOPSY Right     benign   • CHOLECYSTECTOMY  12/2014   • D&C HYSTEROSCOPY  10/01/2015    Dr. Bell   •  DIAGNOSTIC LAPAROSCOPY      Dr. Bell   • HYSTEROSCOPY  10/01/2015    Dr. Bell   • ORIF WRIST FRACTURE Right    • RIGHT OOPHORECTOMY      Dr. Bell   • TOTAL LAPAROSCOPIC HYSTERECTOMY WITH DAVINCI ROBOT  2015    Dr. Bell and Dr. Ott   • TYMPANOPLASTY W/ MASTOIDECTOMY         Social History     Socioeconomic History   • Marital status:      Spouse name: Not on file   • Number of children: Not on file   • Years of education: Not on file   • Highest education level: Not on file   Tobacco Use   • Smoking status: Former Smoker     Packs/day: 0.25     Years: 5.00     Pack years: 1.25     Types: Cigarettes     Last attempt to quit: 1980     Years since quittin.6   • Smokeless tobacco: Never Used   Substance and Sexual Activity   • Alcohol use: Never     Frequency: Never   • Drug use: Never       Family History   Problem Relation Age of Onset   • Hypertension Mother    • Thyroid disease Mother    • Alcohol abuse Mother    • Heart disease Mother    • Stroke Father    • Brain cancer Father    • Heart disease Father    • Thyroid disease Maternal Grandmother    • Stroke Paternal Grandmother        Family history, surgical history, past medical history, Allergies and med's reviewed with patient today and updated in [facility] EMR.     ROS:  Review of Systems   Constitutional: Negative for activity change, appetite change, diaphoresis, fatigue, fever, unexpected weight gain and unexpected weight loss.   Eyes: Negative for blurred vision and visual disturbance.   Respiratory: Negative for apnea, cough, shortness of breath and wheezing.    Cardiovascular: Negative for chest pain, palpitations and leg swelling.   Gastrointestinal: Negative for abdominal pain, constipation, diarrhea, nausea and vomiting.   Endocrine: Negative for cold intolerance and heat intolerance.   Genitourinary: Positive for hematuria. Negative for dysuria, flank pain and frequency.   Musculoskeletal: Negative for myalgias and neck pain.  "  Neurological: Negative for dizziness, tingling, syncope, numbness and headache.   Psychiatric/Behavioral: Negative for depressed mood.       OBJECTIVE:  Vitals:    09/01/20 1310   BP: 118/82   BP Location: Right arm   Patient Position: Sitting   Cuff Size: Large Adult   Pulse: 87   Resp: 16   Temp: 98.3 °F (36.8 °C)   TempSrc: Temporal   SpO2: 94%   Weight: 101 kg (222 lb 9.6 oz)   Height: 166.4 cm (65.5\")     Body mass index is 36.48 kg/m².    Physical Exam   Constitutional: She is oriented to person, place, and time. She appears well-developed and well-nourished.   Neck: Trachea normal and normal range of motion. Neck supple. Carotid bruit is not present. No thyromegaly present.   Cardiovascular: Normal rate, regular rhythm, normal heart sounds and intact distal pulses. Exam reveals no gallop and no friction rub.   No murmur heard.  Pulmonary/Chest: Effort normal and breath sounds normal. She has no wheezes. She has no rales.   Abdominal: Soft. Bowel sounds are normal. There is no hepatosplenomegaly. There is no tenderness. No hernia.   Musculoskeletal: Normal range of motion. She exhibits no edema.        Right knee: She exhibits no swelling and no erythema. No tenderness found.        Left knee: She exhibits no swelling and no erythema. No tenderness found.        Lymphadenopathy:     She has no cervical adenopathy.   Neurological: She is alert and oriented to person, place, and time.   Skin: Skin is warm and dry.   Psychiatric: She has a normal mood and affect. Her behavior is normal. Judgment and thought content normal.       Office Visit on 09/01/2020   Component Date Value Ref Range Status   • Color 09/01/2020 Yellow  Yellow, Straw, Dark Yellow, Jazz Final   • Clarity, UA 09/01/2020 Clear  Clear Final   • Specific Gravity  09/01/2020 1.020  1.005 - 1.030 Final   • pH, Urine 09/01/2020 8.5* 5.0 - 8.0 Final   • Leukocytes 09/01/2020 Negative  Negative Final   • Nitrite, UA 09/01/2020 Negative  Negative " Final   • Protein, POC 09/01/2020 Negative  Negative mg/dL Final   • Glucose, UA 09/01/2020 Negative  Negative, 1000 mg/dL (3+) mg/dL Final   • Ketones, UA 09/01/2020 Negative  Negative Final   • Urobilinogen, UA 09/01/2020 Normal  Normal Final   • Bilirubin 09/01/2020 Negative  Negative Final   • Blood, UA 09/01/2020 Negative  Negative Final       ASSESSMENT/ PLAN:    Diagnoses and all orders for this visit:    1. Hypertension, benign (Primary)  Assessment & Plan:  Better today. Continue current treatment plan.       2. Chronic pain of both knees  Comments:  Likely due to DJD. Will call with x-rays and further recommendations.   May need referral to Ortho.   Orders:  -     XR Knee 3 View Bilateral; Future    3. Right foot pain  Comments:  Will call with x-ray results. May need referral to Podiatry.   Orders:  -     XR Foot 3+ View Right; Future    4. Class 2 severe obesity due to excess calories with serious comorbidity and body mass index (BMI) of 36.0 to 36.9 in adult (CMS/[facility])  Assessment & Plan:  Congratulated on her weight loss.   Continue regular exercise and healthy eating.       5. History of Guillain-[place] syndrome  Assessment & Plan:  Reviewed immunization recommendations and precautions from the CDC.   Tdap and TD are not recommended.   Influenza is at patient's discretion.   Recommended Pneumonia vaccines - she will think about this.       6. Benign essential microscopic hematuria  Assessment & Plan:  No hematuria on today's UA.     Orders:  -     POC Urinalysis Dipstick, Automated      Orders Placed Today:     No orders of the defined types were placed in this encounter.       Management Plan:     An After Visit Summary was printed and given to the patient at discharge.    Follow-up: No follow-ups on file.    Bárbara Marie, APRN 9/1/2020 16:28  This note was electronically signed.

## 2020-09-03 RX ORDER — ENALAPRIL MALEATE AND HYDROCHLOROTHIAZIDE 5; 12.5 MG/1; MG/1
1 TABLET ORAL DAILY
Qty: 90 TABLET | Refills: 1 | Status: SHIPPED | OUTPATIENT
Start: 2020-09-03 | End: 2021-03-03

## 2020-11-16 RX ORDER — MONTELUKAST SODIUM 10 MG/1
TABLET ORAL
Qty: 90 TABLET | Refills: 2 | Status: SHIPPED | OUTPATIENT
Start: 2020-11-16 | End: 2021-09-12

## 2021-02-18 ENCOUNTER — TELEMEDICINE (OUTPATIENT)
Dept: FAMILY MEDICINE CLINIC | Facility: CLINIC | Age: 66
End: 2021-02-18

## 2021-02-18 DIAGNOSIS — J01.90 ACUTE NON-RECURRENT SINUSITIS, UNSPECIFIED LOCATION: Primary | ICD-10-CM

## 2021-02-18 PROCEDURE — 99213 OFFICE O/P EST LOW 20 MIN: CPT | Performed by: NURSE PRACTITIONER

## 2021-02-18 RX ORDER — AMOXICILLIN AND CLAVULANATE POTASSIUM 875; 125 MG/1; MG/1
1 TABLET, FILM COATED ORAL 2 TIMES DAILY
Qty: 20 TABLET | Refills: 0 | Status: SHIPPED | OUTPATIENT
Start: 2021-02-18 | End: 2021-02-28

## 2021-02-18 NOTE — ASSESSMENT & PLAN NOTE
With possible otitis media of right ear.   Will cover with antibiotic.   Discussed anticipated course.   If still running a fever tomorrow I recommended that she have COVID-19 testing, though her symptoms are not consistent with COVID-19 at this time.

## 2021-02-18 NOTE — PROGRESS NOTES
Chief Complaint  Earache and Sinusitis    Subjective          History of Present Illness   Patient presents with complaint of abdominal tenderness on 2/721 - 2/9/21, with low grade fever (under 100) and chills. She initially thought that it could be diverticulitis or constipation. That subsided. Then last night she had a low grade fever (under 100) and chills again only this time it felt like she was getting a head cold with sinus pressure and right ear pain off and on. This morning, low grade fever (under 100), no chills, but had a bad sinus-type headache. No congestion, runny nose, cough or sore throat. Sneezing intermittently. She took one 500 mg Tylenol this morning and used Fluticasone nasal spray.    Temperature max - 100.3 this afternoon after getting out of the shower. No known COVID-19 contacts. No loss of sense of taste or smell.      Patient was seen today through synchronous audio/video technology during the COVID-19 Public Health Emergency. Verbal consent was obtained. The patient was located at home. Vitals signs were not obtained due to lack of home monitoring access. Time spent with patient was 15 minutes.        Current Outpatient Medications on File Prior to Visit   Medication Sig Dispense Refill   • albuterol sulfate HFA (ProAir HFA) 108 (90 Base) MCG/ACT inhaler PROAIR  (90 Base) MCG/ACT AERS     • aspirin (Liz Low Dose) 81 MG EC tablet LIZ LOW DOSE 81 MG TBEC     • Enalapril-hydroCHLOROthiazide 5-12.5 MG per tablet Take 1 tablet by mouth Daily. 90 tablet 1   • EPINEPHrine (EPIPEN 2-FAWN) 0.3 MG/0.3ML solution auto-injector injection Inject 0.3 mL into the appropriate muscle as directed by prescriber As Needed (as needed for anaphylaxis). 2 each 0   • famotidine (PEPCID) 40 MG tablet Daily.     • fluticasone (Veramyst) 27.5 MCG/SPRAY nasal spray into the nostril(s) as directed by provider.     • Loratadine (Claritin) 10 MG capsule Take 1 capsule by mouth.     • montelukast (SINGULAIR)  10 MG tablet TAKE 1 TABLET BY MOUTH EVERY EVENING FOR ALLERGIES 90 tablet 2   • Multiple Vitamins-Minerals (ZINC PO) Take  by mouth.     • naproxen sodium (ALEVE) 220 MG tablet Take 220 mg by mouth 2 (Two) Times a Day As Needed.     • Omega-3 Fatty Acids (FISH OIL) 500 MG capsule capsule 1 capsule Daily.     • Zinc 25 MG tablet Take 25 mg by mouth Daily.       No current facility-administered medications on file prior to visit.         Review of Systems   Constitutional: Positive for fatigue. Negative for activity change, appetite change and diaphoresis.   HENT: Positive for congestion, hearing loss, rhinorrhea and sinus pressure. Negative for ear discharge, facial swelling, mouth sores, nosebleeds, postnasal drip, sore throat, swollen glands, trouble swallowing and voice change.    Eyes: Negative for discharge, redness and itching.   Respiratory: Negative for cough, chest tightness, shortness of breath and wheezing.    Cardiovascular: Negative for chest pain and palpitations.   Gastrointestinal: Negative for diarrhea, nausea and vomiting.   Musculoskeletal: Negative for myalgias, neck pain and neck stiffness.   Skin: Negative for rash.   Neurological: Positive for headache. Negative for dizziness.   Hematological: Negative for adenopathy.        Objective   Vital Signs:   There were no vitals filed for this visit.  There is no height or weight on file to calculate BMI.  No LMP recorded. Patient has had a hysterectomy.     PHQ-2 Depression Screening  Little interest or pleasure in doing things?     Feeling down, depressed, or hopeless?     PHQ-2 Total Score      PHQ-9 Depression Screening  Little interest or pleasure in doing things?     Feeling down, depressed, or hopeless?     Trouble falling or staying asleep, or sleeping too much?     Feeling tired or having little energy?     Poor appetite or overeating?     Feeling bad about yourself - or that you are a failure or have let yourself or your family down?      Trouble concentrating on things, such as reading the newspaper or watching television?     Moving or speaking so slowly that other people could have noticed? Or the opposite - being so fidgety or restless that you have been moving around a lot more than usual?     Thoughts that you would be better off dead, or of hurting yourself in some way?     PHQ-9 Total Score     If you checked off any problems, how difficult have these problems made it for you to do your work, take care of things at home, or get along with other people?       Physical Exam  Constitutional:       General: She is not in acute distress.  Pulmonary:      Effort: Pulmonary effort is normal. No respiratory distress.   Neurological:      Mental Status: She is alert.   Psychiatric:         Mood and Affect: Mood normal.         Behavior: Behavior normal.         Thought Content: Thought content normal.         Judgment: Judgment normal.          Result Review :       No visits with results within 1 Day(s) from this visit.   Latest known visit with results is:   Office Visit on 09/01/2020   Component Date Value Ref Range Status   • Color 09/01/2020 Yellow  Yellow, Straw, Dark Yellow, Jazz Final   • Clarity, UA 09/01/2020 Clear  Clear Final   • Specific Gravity  09/01/2020 1.020  1.005 - 1.030 Final   • pH, Urine 09/01/2020 8.5* 5.0 - 8.0 Final   • Leukocytes 09/01/2020 Negative  Negative Final   • Nitrite, UA 09/01/2020 Negative  Negative Final   • Protein, POC 09/01/2020 Negative  Negative mg/dL Final   • Glucose, UA 09/01/2020 Negative  Negative, 1000 mg/dL (3+) mg/dL Final   • Ketones, UA 09/01/2020 Negative  Negative Final   • Urobilinogen, UA 09/01/2020 Normal  Normal Final   • Bilirubin 09/01/2020 Negative  Negative Final   • Blood, UA 09/01/2020 Negative  Negative Final                 Assessment and Plan    Diagnoses and all orders for this visit:    1. Acute non-recurrent sinusitis, unspecified location (Primary)  Assessment & Plan:  With  possible otitis media of right ear.   Will cover with antibiotic.   Discussed anticipated course.   If still running a fever tomorrow I recommended that she have COVID-19 testing, though her symptoms are not consistent with COVID-19 at this time.       Other orders  -     amoxicillin-clavulanate (Augmentin) 875-125 MG per tablet; Take 1 tablet by mouth 2 (Two) Times a Day for 10 days.  Dispense: 20 tablet; Refill: 0          Follow Up   No follow-ups on file.    Patient was given instructions and counseling regarding her condition and health maintenance advice. Please see specific information in the After Visit Summary.     Bárbara Marie, APRN 2/18/2021 16:45 EST  This note was electronically signed.

## 2021-02-23 ENCOUNTER — TELEPHONE (OUTPATIENT)
Dept: FAMILY MEDICINE CLINIC | Facility: CLINIC | Age: 66
End: 2021-02-23

## 2021-02-23 NOTE — TELEPHONE ENCOUNTER
Since her symptoms are mild it wouldn't change anything that we do for her even if a COVID test was positive. She should self-quarantine and keep taking her antibiotics. If her symptoms worsen or if she starts to run a fever then I would consider testing.

## 2021-02-23 NOTE — TELEPHONE ENCOUNTER
Says she had a fever last week but nothing since. No cough, SOB, fever, sore throat or any other symptoms other than fatigue. She is still taking the antibiotic. Yesterday she didn't have a sense of smell but does have it today. Still having some congestion, is using the nasal spray. Only concern right now is the fatigue.   She says that when you spoke with her on Thursday she was told to be tested on Friday IF she had a high fever, patient says that she did not have a fever on Friday and hasn't since.   Wanting to know if she should get tested now or if it would even show since she's been sick so long?

## 2021-02-23 NOTE — TELEPHONE ENCOUNTER
PATIENT CONCERNED ABOUT HAVING COVID. PATIENT WANTS TO KNOW IF SHE SHOULD BE TESTED FOR COVID SHE HAS A SLIGHT FEVER AND VERY FATIGUED. . PATIENT WOULD LIKE TO DISCUSS SYMPTOMS WITH A NURSE AND WHAT KIND OF TESTING IS DONE WITHIN THE OFFICE IF SHE DOES DECIDE TO BE TESTED.     CALLBACK NUMBER - [phone]

## 2021-03-03 RX ORDER — ENALAPRIL MALEATE AND HYDROCHLOROTHIAZIDE 5; 12.5 MG/1; MG/1
TABLET ORAL
Qty: 90 TABLET | Refills: 0 | Status: SHIPPED | OUTPATIENT
Start: 2021-03-03 | End: 2021-05-28

## 2021-03-08 ENCOUNTER — TELEPHONE (OUTPATIENT)
Dept: FAMILY MEDICINE CLINIC | Facility: CLINIC | Age: 66
End: 2021-03-08

## 2021-03-08 NOTE — TELEPHONE ENCOUNTER
I'd like her to come in to be seen. Then we can check the urine to see if it might be a UTI or order a CT scan possible if we think it is diverticulitis.

## 2021-03-08 NOTE — TELEPHONE ENCOUNTER
----- Message from Alla Cole sent at 3/7/2021 10:30 PM EST -----  Regarding: Non-Urgent Medical Question  Contact: [phone]  Dory Granger,   After my phone appt. with you, I began the augmentin antibiotic that you prescribed on 2.18.21 -2.28.21. It took care of the abdominal issues, sinus and ear infection. However today after Judaism 7 days later from finishing the antibiotic, the tenderness and cramping in the abdomen have returned. Tonight low grade fever of 99.7. I did have a BM twice today so it isn't constipation related. It feels exactly like it did the first week of February for 4-5 days before I spoke to you on the 18th.  I wonder if it is possibly another flare of diverticulosis? If it were bladder related would these symptoms be the same? Should I take another round of antibiotics? A scan? Took tylenol and am headed to bed with the heating pad. Thanks, Leti

## 2021-03-08 NOTE — TELEPHONE ENCOUNTER
PATIENT CALLED REGARDING MESSAGE SHE SENT IN FROM HER [employer] ACCOUNT LAST NIGHT. SHE IS HAVING PAINS IN ABDOMEN AGAIN, SAME EXACT THING THAT SHE HAD LAST TIME. BELLY BUTTON DOWN TO PUBIC ALL THE WAY ACROSS. ALSO HAVING LOW GRADE TEMP, 99.4    PLEASE CALL PATIENT TO ADVISE ON NEXT STEPS [phone]

## 2021-03-10 ENCOUNTER — HOSPITAL ENCOUNTER (OUTPATIENT)
Dept: CT IMAGING | Facility: HOSPITAL | Age: 66
Discharge: HOME | End: 2021-03-10
Admitting: NURSE PRACTITIONER

## 2021-03-10 ENCOUNTER — OFFICE VISIT (OUTPATIENT)
Dept: FAMILY MEDICINE CLINIC | Facility: CLINIC | Age: 66
End: 2021-03-10

## 2021-03-10 VITALS
SYSTOLIC BLOOD PRESSURE: 131 MMHG | HEART RATE: 99 BPM | TEMPERATURE: 97.7 F | DIASTOLIC BLOOD PRESSURE: 83 MMHG | RESPIRATION RATE: 16 BRPM | HEIGHT: 66 IN | BODY MASS INDEX: 35.03 KG/M2 | WEIGHT: 218 LBS | OXYGEN SATURATION: 96 %

## 2021-03-10 DIAGNOSIS — K57.92 DIVERTICULITIS: Primary | ICD-10-CM

## 2021-03-10 DIAGNOSIS — R82.90 ABNORMAL URINALYSIS: ICD-10-CM

## 2021-03-10 DIAGNOSIS — R10.9 ABDOMINAL PAIN, UNSPECIFIED ABDOMINAL LOCATION: ICD-10-CM

## 2021-03-10 LAB
BILIRUB BLD-MCNC: NEGATIVE MG/DL
CLARITY, POC: CLEAR
COLOR UR: ABNORMAL
CREAT BLDA-MCNC: 0.7 MG/DL (ref 0.6–1.3)
GLUCOSE UR STRIP-MCNC: NEGATIVE MG/DL
KETONES UR QL: NEGATIVE
LEUKOCYTE EST, POC: NEGATIVE
NITRITE UR-MCNC: NEGATIVE MG/ML
PH UR: 6 [PH] (ref 5–8)
PROT UR STRIP-MCNC: ABNORMAL MG/DL
RBC # UR STRIP: NEGATIVE /UL
SP GR UR: 1.03 (ref 1–1.03)
UROBILINOGEN UR QL: NORMAL

## 2021-03-10 PROCEDURE — 99214 OFFICE O/P EST MOD 30 MIN: CPT | Performed by: NURSE PRACTITIONER

## 2021-03-10 PROCEDURE — 82565 ASSAY OF CREATININE: CPT

## 2021-03-10 PROCEDURE — 81003 URINALYSIS AUTO W/O SCOPE: CPT | Performed by: NURSE PRACTITIONER

## 2021-03-10 PROCEDURE — 0 IOPAMIDOL PER 1 ML: Performed by: NURSE PRACTITIONER

## 2021-03-10 PROCEDURE — 74177 CT ABD & PELVIS W/CONTRAST: CPT

## 2021-03-10 RX ORDER — MELATONIN
4000 DAILY
COMMUNITY

## 2021-03-10 RX ORDER — METRONIDAZOLE 500 MG/1
500 TABLET ORAL 3 TIMES DAILY
Qty: 30 TABLET | Refills: 0 | Status: SHIPPED | OUTPATIENT
Start: 2021-03-10 | End: 2021-03-20

## 2021-03-10 RX ORDER — ONDANSETRON 4 MG/1
4 TABLET, FILM COATED ORAL EVERY 8 HOURS PRN
Qty: 30 TABLET | Refills: 0 | Status: SHIPPED | OUTPATIENT
Start: 2021-03-10 | End: 2021-03-20

## 2021-03-10 RX ORDER — CIPROFLOXACIN 750 MG/1
750 TABLET, FILM COATED ORAL 2 TIMES DAILY
Qty: 20 TABLET | Refills: 0 | Status: SHIPPED | OUTPATIENT
Start: 2021-03-10 | End: 2021-03-20

## 2021-03-10 RX ADMIN — IOPAMIDOL 100 ML: 755 INJECTION, SOLUTION INTRAVENOUS at 12:37

## 2021-03-10 NOTE — ASSESSMENT & PLAN NOTE
Discussed CT findings and nature of the condition.  Discussed treatment options.  Since she did not have complete resolution with Augmentin will use Cipro and Flagyl.  [place] diet, advance as tolerated.  Increase fluid intake.  Follow-up in 1 to 2 weeks, sooner for any worsening.

## 2021-03-10 NOTE — PROGRESS NOTES
Chief Complaint  Abdominal Pain    Subjective     {  Problem List  Visit Diagnosis   Encounters  Notes  Medications  Labs  Result Review Imaging  Media :23}     History of Present Illness      Patient had previously had abdominal tenderness on 2/7/21 - 2/9/21, with low grade fever (under 100) and chills. She initially thought that it could be diverticulitis or constipation, then it subsided.  A few days later she felt like she was getting a head cold with sinus pressure and right ear pain off and on and a low grade fever (Temp max 100.3), no chills, but had a bad sinus-type headache.     She was seen on 2/18/21 via telemedicine visit and treated for presumed acute sinusitis. She took Augmentin from 2/18 - 2/28. It took care of the abdominal issues, sinus and ear infection. However, on 3/7/21 the tenderness and cramping in the lower abdomen returned. Temp max has been 99.7. She denies constipation, diarrhea, bloody stools. Last BM was on Monday - 2 soft, normal BM's. No vomiting, but is nauseated.         Current Outpatient Medications:   •  albuterol sulfate HFA (ProAir HFA) 108 (90 Base) MCG/ACT inhaler, PROAIR  (90 Base) MCG/ACT AERS, Disp: , Rfl:   •  cholecalciferol (VITAMIN D3) 25 MCG (1000 UT) tablet, Take 4,000 Units by mouth Daily., Disp: , Rfl:   •  Enalapril-hydroCHLOROthiazide 5-12.5 MG per tablet, TAKE 1 TABLET BY MOUTH EVERY DAY, Disp: 90 tablet, Rfl: 0  •  EPINEPHrine (EPIPEN 2-FAWN) 0.3 MG/0.3ML solution auto-injector injection, Inject 0.3 mL into the appropriate muscle as directed by prescriber As Needed (as needed for anaphylaxis)., Disp: 2 each, Rfl: 0  •  famotidine (PEPCID) 40 MG tablet, Take 40 mg by mouth At Night As Needed., Disp: , Rfl:   •  fluticasone (Veramyst) 27.5 MCG/SPRAY nasal spray, into the nostril(s) as directed by provider., Disp: , Rfl:   •  Loratadine (Claritin) 10 MG capsule, Take 1 capsule by mouth., Disp: , Rfl:   •  montelukast (SINGULAIR) 10 MG tablet, TAKE  1 TABLET BY MOUTH EVERY EVENING FOR ALLERGIES, Disp: 90 tablet, Rfl: 2  •  Multiple Vitamins-Minerals (ZINC PO), Take  by mouth., Disp: , Rfl:   •  Omega-3 Fatty Acids (FISH OIL) 500 MG capsule capsule, 1 capsule Daily., Disp: , Rfl:   •  Zinc 25 MG tablet, Take 25 mg by mouth Daily., Disp: , Rfl:   •  aspirin (Liz Low Dose) 81 MG EC tablet, LIZ LOW DOSE 81 MG TBEC, Disp: , Rfl:   •  ciprofloxacin (CIPRO) 750 MG tablet, Take 1 tablet by mouth 2 (Two) Times a Day for 10 days., Disp: 20 tablet, Rfl: 0  •  metroNIDAZOLE (Flagyl) 500 MG tablet, Take 1 tablet by mouth 3 (Three) Times a Day for 10 days., Disp: 30 tablet, Rfl: 0  •  naproxen sodium (ALEVE) 220 MG tablet, Take 220 mg by mouth 2 (Two) Times a Day As Needed., Disp: , Rfl:   •  ondansetron (Zofran) 4 MG tablet, Take 1 tablet by mouth Every 8 (Eight) Hours As Needed for Nausea or Vomiting for up to 10 days., Disp: 30 tablet, Rfl: 0  No current facility-administered medications for this visit.     Review of Systems   Constitutional: Negative for activity change, appetite change, chills, diaphoresis, fatigue and fever.   HENT: Negative for congestion, ear discharge, ear pain, facial swelling, hearing loss, mouth sores, nosebleeds, postnasal drip, rhinorrhea, sinus pressure, sneezing, sore throat, swollen glands, trouble swallowing and voice change.    Eyes: Negative for discharge, redness and itching.   Respiratory: Negative for cough, chest tightness, shortness of breath and wheezing.    Cardiovascular: Negative for chest pain and palpitations.   Gastrointestinal: Negative for blood in stool, diarrhea, nausea and vomiting.   Genitourinary: Negative for difficulty urinating, dysuria, flank pain, frequency, hematuria, pelvic pain and vaginal discharge.   Musculoskeletal: Negative for myalgias, neck pain and neck stiffness.   Skin: Negative for rash.   Allergic/Immunologic: Negative for environmental allergies.   Neurological: Negative for dizziness and  "headache.   Hematological: Negative for adenopathy.        Objective   Vital Signs:   Vitals:    03/10/21 1017   BP: 131/83   BP Location: Left arm   Patient Position: Sitting   Cuff Size: Large Adult   Pulse: 99   Resp: 16   Temp: 97.7 °F (36.5 °C)   TempSrc: Infrared   SpO2: 96%   Weight: 98.9 kg (218 lb)   Height: 166.4 cm (65.5\")     Body mass index is 35.73 kg/m².    PHQ-2 Depression Screening  Little interest or pleasure in doing things? 0   Feeling down, depressed, or hopeless? 0   PHQ-2 Total Score 0       Physical Exam  Constitutional:       Appearance: She is well-developed.   Neck:      Thyroid: No thyromegaly.      Vascular: No carotid bruit.      Trachea: Trachea normal.   Cardiovascular:      Rate and Rhythm: Normal rate and regular rhythm.      Heart sounds: Normal heart sounds. No murmur. No friction rub. No gallop.    Pulmonary:      Effort: Pulmonary effort is normal.      Breath sounds: Normal breath sounds. No wheezing or rales.   Abdominal:      General: Bowel sounds are normal.      Palpations: Abdomen is soft.      Tenderness: There is generalized abdominal tenderness. There is no right CVA tenderness, left CVA tenderness, guarding or rebound.      Hernia: No hernia is present.      Comments: Generalized, but worse in RLQ & LLQ   Musculoskeletal:         General: Normal range of motion.      Cervical back: Normal range of motion and neck supple.   Lymphadenopathy:      Cervical: No cervical adenopathy.   Skin:     General: Skin is warm and dry.   Neurological:      Mental Status: She is alert and oriented to person, place, and time.   Psychiatric:         Behavior: Behavior normal.         Thought Content: Thought content normal.         Judgment: Judgment normal.          Result Review :   The following data was reviewed by: VIRGIL Carlson on 03/10/2021:  CMP    CMP 6/3/20 3/10/21   Glucose 87    BUN 12    Creatinine 0.68 0.70   eGFR Non  Am 92    eGFR  Am 106    Sodium 141 "    Potassium 4.2    Chloride 98    Calcium 9.9    Total Protein 6.9    Albumin 4.6    Globulin 2.3    Total Bilirubin 0.5    Alkaline Phosphatase 62    AST (SGOT) 22    ALT (SGPT) 31       Comments are available for some flowsheets but are not being displayed.           CBC    CBC 6/3/20   WBC 7.4   RBC 4.91   Hemoglobin 15.0   Hematocrit 44.5   MCV 91   MCH 30.5   MCHC 33.7   RDW 13.1   Platelets 305           Hospital Outpatient Visit on 03/10/2021   Component Date Value Ref Range Status   • Creatinine 03/10/2021 0.70  0.60 - 1.30 mg/dL Final    Serial Number: [id number]:  [id number]   Office Visit on 03/10/2021   Component Date Value Ref Range Status   • Color 03/10/2021 Dark Yellow  Yellow, Straw, Dark Yellow, Jazz Final   • Clarity, UA 03/10/2021 Clear  Clear Final   • Specific Gravity  03/10/2021 1.030  1.005 - 1.030 Final   • pH, Urine 03/10/2021 6.0  5.0 - 8.0 Final   • Leukocytes 03/10/2021 Negative  Negative Final   • Nitrite, UA 03/10/2021 Negative  Negative Final   • Protein, POC 03/10/2021 2+* Negative mg/dL Final   • Glucose, UA 03/10/2021 Negative  Negative, 1000 mg/dL (3+) mg/dL Final   • Ketones, UA 03/10/2021 Negative  Negative Final   • Urobilinogen, UA 03/10/2021 Normal  Normal Final   • Bilirubin 03/10/2021 Negative  Negative Final   • Blood, UA 03/10/2021 Negative  Negative Final                 Assessment and Plan    Diagnoses and all orders for this visit:    1. Diverticulitis (Primary)  Assessment & Plan:  Discussed CT findings and nature of the condition.  Discussed treatment options.  Since she did not have complete resolution with Augmentin will use Cipro and Flagyl.  Rotan diet, advance as tolerated.  Increase fluid intake.  Follow-up in 1 to 2 weeks, sooner for any worsening.    Orders:  -     ciprofloxacin (CIPRO) 750 MG tablet; Take 1 tablet by mouth 2 (Two) Times a Day for 10 days.  Dispense: 20 tablet; Refill: 0  -     metroNIDAZOLE (Flagyl) 500 MG tablet; Take 1 tablet by mouth 3  (Three) Times a Day for 10 days.  Dispense: 30 tablet; Refill: 0  -     ondansetron (Zofran) 4 MG tablet; Take 1 tablet by mouth Every 8 (Eight) Hours As Needed for Nausea or Vomiting for up to 10 days.  Dispense: 30 tablet; Refill: 0    2. Abdominal pain, unspecified abdominal location  -     POC Urinalysis Dipstick, Automated  -     CT Abdomen Pelvis With Contrast    3. Abnormal urinalysis  -     Urine Culture - Urine, Urine, Random Void          Follow Up   No follow-ups on file.    Patient was given instructions and counseling regarding her condition and health maintenance advice. Please see specific information in the After Visit Summary.     VIRGIL Carlson 3/10/2021 15:42 EST  This note was electronically signed.

## 2021-03-12 LAB
BACTERIA UR CULT: NORMAL
BACTERIA UR CULT: NORMAL

## 2021-03-18 ENCOUNTER — OFFICE VISIT (OUTPATIENT)
Dept: FAMILY MEDICINE CLINIC | Facility: CLINIC | Age: 66
End: 2021-03-18

## 2021-03-18 VITALS
RESPIRATION RATE: 16 BRPM | DIASTOLIC BLOOD PRESSURE: 82 MMHG | WEIGHT: 224.2 LBS | SYSTOLIC BLOOD PRESSURE: 143 MMHG | HEART RATE: 100 BPM | OXYGEN SATURATION: 94 % | TEMPERATURE: 97.8 F | BODY MASS INDEX: 36.03 KG/M2 | HEIGHT: 66 IN

## 2021-03-18 DIAGNOSIS — E66.01 CLASS 2 SEVERE OBESITY DUE TO EXCESS CALORIES WITH SERIOUS COMORBIDITY AND BODY MASS INDEX (BMI) OF 36.0 TO 36.9 IN ADULT (HCC): ICD-10-CM

## 2021-03-18 DIAGNOSIS — K57.92 DIVERTICULITIS: Primary | ICD-10-CM

## 2021-03-18 DIAGNOSIS — D12.6 TUBULAR ADENOMA OF COLON: ICD-10-CM

## 2021-03-18 DIAGNOSIS — R80.9 PROTEINURIA, UNSPECIFIED TYPE: ICD-10-CM

## 2021-03-18 DIAGNOSIS — I10 HYPERTENSION, BENIGN: ICD-10-CM

## 2021-03-18 PROBLEM — J01.90 ACUTE NON-RECURRENT SINUSITIS: Status: RESOLVED | Noted: 2021-02-18 | Resolved: 2021-03-18

## 2021-03-18 LAB
BILIRUB BLD-MCNC: NEGATIVE MG/DL
CLARITY, POC: CLEAR
COLOR UR: YELLOW
GLUCOSE UR STRIP-MCNC: NEGATIVE MG/DL
KETONES UR QL: NEGATIVE
LEUKOCYTE EST, POC: ABNORMAL
NITRITE UR-MCNC: NEGATIVE MG/ML
PH UR: 5.5 [PH] (ref 5–8)
PROT UR STRIP-MCNC: NEGATIVE MG/DL
RBC # UR STRIP: NEGATIVE /UL
SP GR UR: 1.03 (ref 1–1.03)
UROBILINOGEN UR QL: NORMAL

## 2021-03-18 PROCEDURE — 99214 OFFICE O/P EST MOD 30 MIN: CPT | Performed by: NURSE PRACTITIONER

## 2021-03-18 PROCEDURE — 81003 URINALYSIS AUTO W/O SCOPE: CPT | Performed by: NURSE PRACTITIONER

## 2021-03-18 NOTE — ASSESSMENT & PLAN NOTE
Recommended that she get colonoscopy scheduled, however she should wait a couple of months due to recent acute diverticulitis.

## 2021-03-18 NOTE — ASSESSMENT & PLAN NOTE
Reviewed CT findings and nature of the condition.  Symptomatically improving.  Discussed dietary recommendations.  Return if not continuing to resolve over the next week.

## 2021-03-18 NOTE — PROGRESS NOTES
Chief Complaint  Diverticulitis    Subjective          History of Present Illness  Patient is following up on an episode of acute diverticulitis.  She had symptoms early in February that resolved after she was treated for acute sinusitis with Augmentin.  And she had a recurrence of symptoms that began on 3/7/2021.  She was having abdominal tenderness and cramping in the lower abdomen, with nausea.  She did not have vomiting, constipation, diarrhea, or bloody stools.  She ran a temperature max of 99.7 during that time.  CT scan done on 3/10/2021 showed acute sigmoid diverticulitis without evidence of gross perforation or abscess.  She was treated with Cipro and Flagyl and was given Zofran to use as needed for nausea.    Today she is feeling much better. Pain is almost gone. She is still taking the Cipro and Flagyl and has not been nauseated. No fever or chills. No rectal bleeding.         Current Outpatient Medications:   •  albuterol sulfate HFA (ProAir HFA) 108 (90 Base) MCG/ACT inhaler, PROAIR  (90 Base) MCG/ACT AERS, Disp: , Rfl:   •  aspirin (Liz Low Dose) 81 MG EC tablet, LIZ LOW DOSE 81 MG TBEC, Disp: , Rfl:   •  cholecalciferol (VITAMIN D3) 25 MCG (1000 UT) tablet, Take 4,000 Units by mouth Daily., Disp: , Rfl:   •  ciprofloxacin (CIPRO) 750 MG tablet, Take 1 tablet by mouth 2 (Two) Times a Day for 10 days., Disp: 20 tablet, Rfl: 0  •  Enalapril-hydroCHLOROthiazide 5-12.5 MG per tablet, TAKE 1 TABLET BY MOUTH EVERY DAY, Disp: 90 tablet, Rfl: 0  •  EPINEPHrine (EPIPEN 2-FAWN) 0.3 MG/0.3ML solution auto-injector injection, Inject 0.3 mL into the appropriate muscle as directed by prescriber As Needed (as needed for anaphylaxis)., Disp: 2 each, Rfl: 0  •  famotidine (PEPCID) 40 MG tablet, Take 40 mg by mouth At Night As Needed., Disp: , Rfl:   •  fluticasone (Veramyst) 27.5 MCG/SPRAY nasal spray, into the nostril(s) as directed by provider., Disp: , Rfl:   •  Loratadine (Claritin) 10 MG capsule, Take  "1 capsule by mouth., Disp: , Rfl:   •  metroNIDAZOLE (Flagyl) 500 MG tablet, Take 1 tablet by mouth 3 (Three) Times a Day for 10 days., Disp: 30 tablet, Rfl: 0  •  montelukast (SINGULAIR) 10 MG tablet, TAKE 1 TABLET BY MOUTH EVERY EVENING FOR ALLERGIES, Disp: 90 tablet, Rfl: 2  •  Multiple Vitamins-Minerals (ZINC PO), Take  by mouth., Disp: , Rfl:   •  naproxen sodium (ALEVE) 220 MG tablet, Take 220 mg by mouth 2 (Two) Times a Day As Needed., Disp: , Rfl:   •  Omega-3 Fatty Acids (FISH OIL) 500 MG capsule capsule, 1 capsule Daily., Disp: , Rfl:   •  ondansetron (Zofran) 4 MG tablet, Take 1 tablet by mouth Every 8 (Eight) Hours As Needed for Nausea or Vomiting for up to 10 days., Disp: 30 tablet, Rfl: 0  •  Zinc 25 MG tablet, Take 25 mg by mouth Daily., Disp: , Rfl:      Review of Systems   Constitutional: Negative for activity change, appetite change, chills, diaphoresis, fatigue and fever.   Respiratory: Negative for cough, shortness of breath and wheezing.    Cardiovascular: Negative for chest pain and palpitations.   Gastrointestinal: Negative for blood in stool, diarrhea, nausea and vomiting.   Genitourinary: Negative for difficulty urinating, dysuria, flank pain, frequency, hematuria, pelvic pain and vaginal discharge.   Musculoskeletal: Negative for myalgias, neck pain and neck stiffness.   Skin: Negative for rash.   Neurological: Negative for dizziness and headache.   Hematological: Negative for adenopathy.        Objective   Vital Signs:   Vitals:    03/18/21 1537   BP: 143/82   BP Location: Left arm   Patient Position: Sitting   Cuff Size: Large Adult   Pulse: 100   Resp: 16   Temp: 97.8 °F (36.6 °C)   TempSrc: Temporal   SpO2: 94%   Weight: 102 kg (224 lb 3.2 oz)   Height: 166.4 cm (65.5\")     Body mass index is 36.74 kg/m².      Physical Exam  Constitutional:       Appearance: She is well-developed.   Neck:      Thyroid: No thyromegaly.      Vascular: No carotid bruit.      Trachea: Trachea normal. "   Cardiovascular:      Rate and Rhythm: Normal rate and regular rhythm.      Heart sounds: Normal heart sounds. No murmur heard.   No friction rub. No gallop.    Pulmonary:      Effort: Pulmonary effort is normal.      Breath sounds: Normal breath sounds. No wheezing or rales.   Abdominal:      General: Bowel sounds are normal.      Palpations: Abdomen is soft.      Tenderness: There is no abdominal tenderness. There is no right CVA tenderness, left CVA tenderness, guarding or rebound.      Hernia: No hernia is present.   Musculoskeletal:         General: Normal range of motion.      Cervical back: Normal range of motion and neck supple.   Lymphadenopathy:      Cervical: No cervical adenopathy.   Skin:     General: Skin is warm and dry.   Neurological:      Mental Status: She is alert and oriented to person, place, and time.   Psychiatric:         Behavior: Behavior normal.         Thought Content: Thought content normal.         Judgment: Judgment normal.          Result Review :   The following data was reviewed by: VIRGIL Carlson on 03/18/2021:  CMP    CMP 6/3/20 3/10/21   Glucose 87    BUN 12    Creatinine 0.68 0.70   eGFR Non  Am 92    eGFR  Am 106    Sodium 141    Potassium 4.2    Chloride 98    Calcium 9.9    Total Protein 6.9    Albumin 4.6    Globulin 2.3    Total Bilirubin 0.5    Alkaline Phosphatase 62    AST (SGOT) 22    ALT (SGPT) 31       Comments are available for some flowsheets but are not being displayed.           CBC    CBC 6/3/20   WBC 7.4   RBC 4.91   Hemoglobin 15.0   Hematocrit 44.5   MCV 91   MCH 30.5   MCHC 33.7   RDW 13.1   Platelets 305           Office Visit on 03/18/2021   Component Date Value Ref Range Status   • Color 03/18/2021 Yellow  Yellow, Straw, Dark Yellow, Jazz Final   • Clarity, UA 03/18/2021 Clear  Clear Final   • Specific Gravity  03/18/2021 1.030  1.005 - 1.030 Final   • pH, Urine 03/18/2021 5.5  5.0 - 8.0 Final   • Leukocytes 03/18/2021 Trace* Negative  Final   • Nitrite, UA 03/18/2021 Negative  Negative Final   • Protein, POC 03/18/2021 Negative  Negative mg/dL Final   • Glucose, UA 03/18/2021 Negative  Negative, 1000 mg/dL (3+) mg/dL Final   • Ketones, UA 03/18/2021 Negative  Negative Final   • Urobilinogen, UA 03/18/2021 Normal  Normal Final   • Bilirubin 03/18/2021 Negative  Negative Final   • Blood, UA 03/18/2021 Negative  Negative Final     Data reviewed: Radiologic studies 3/10/2021 CT of abdomen and pelvis and 2015 colonoscopy and path reports            Assessment and Plan    Diagnoses and all orders for this visit:    1. Diverticulitis (Primary)  Assessment & Plan:  Reviewed CT findings and nature of the condition.  Symptomatically improving.  Discussed dietary recommendations.  Return if not continuing to resolve over the next week.      2. Proteinuria, unspecified type  Comments:  Resolved  Orders:  -     POC Urinalysis Dipstick, Automated    3. Tubular adenoma of colon  Assessment & Plan:  Recommended that she get colonoscopy scheduled, however she should wait a couple of months due to recent acute diverticulitis.    Orders:  -     Ambulatory Referral For Screening Colonoscopy    4. Hypertension, benign  Assessment & Plan:  Mild elevation today.  We will recheck at her appointment in June.      5. Class 2 severe obesity due to excess calories with serious comorbidity and body mass index (BMI) of 36.0 to 36.9 in adult (CMS/[facility])  Assessment & Plan:  Encouraged healthy diet, increase in physical activity, and weight loss            Follow Up   Return in about 3 months (around 6/4/2021) for Medicare Wellness.    Patient was given instructions and counseling regarding her condition and health maintenance advice. Please see specific information in the After Visit Summary.     VIRGIL Carlson 3/18/2021 16:13 EDT  This note was electronically signed.

## 2021-03-20 RX ORDER — ALBUTEROL SULFATE 90 UG/1
2 AEROSOL, METERED RESPIRATORY (INHALATION) EVERY 4 HOURS PRN
Qty: 18 G | Refills: 0 | Status: SHIPPED | OUTPATIENT
Start: 2021-03-20 | End: 2022-02-01 | Stop reason: SDUPTHER

## 2021-03-22 RX ORDER — FLUCONAZOLE 150 MG/1
150 TABLET ORAL ONCE
Qty: 1 TABLET | Refills: 0 | Status: SHIPPED | OUTPATIENT
Start: 2021-03-22 | End: 2021-03-22

## 2021-05-28 RX ORDER — ENALAPRIL MALEATE AND HYDROCHLOROTHIAZIDE 5; 12.5 MG/1; MG/1
TABLET ORAL
Qty: 90 TABLET | Refills: 0 | Status: SHIPPED | OUTPATIENT
Start: 2021-05-28 | End: 2021-06-19

## 2021-06-19 RX ORDER — ENALAPRIL MALEATE AND HYDROCHLOROTHIAZIDE 5; 12.5 MG/1; MG/1
TABLET ORAL
Qty: 30 TABLET | Refills: 0 | Status: SHIPPED | OUTPATIENT
Start: 2021-06-19 | End: 2021-09-03

## 2021-06-22 ENCOUNTER — OFFICE VISIT (OUTPATIENT)
Dept: FAMILY MEDICINE CLINIC | Facility: CLINIC | Age: 66
End: 2021-06-22

## 2021-06-22 VITALS
TEMPERATURE: 97.9 F | WEIGHT: 220 LBS | HEIGHT: 66 IN | BODY MASS INDEX: 35.36 KG/M2 | DIASTOLIC BLOOD PRESSURE: 80 MMHG | OXYGEN SATURATION: 90 % | RESPIRATION RATE: 16 BRPM | HEART RATE: 96 BPM | SYSTOLIC BLOOD PRESSURE: 139 MMHG

## 2021-06-22 DIAGNOSIS — E66.01 CLASS 2 SEVERE OBESITY DUE TO EXCESS CALORIES WITH SERIOUS COMORBIDITY AND BODY MASS INDEX (BMI) OF 36.0 TO 36.9 IN ADULT (HCC): ICD-10-CM

## 2021-06-22 DIAGNOSIS — Z01.84 IMMUNITY STATUS TESTING: ICD-10-CM

## 2021-06-22 DIAGNOSIS — G43.709 CHRONIC MIGRAINE WITHOUT AURA WITHOUT STATUS MIGRAINOSUS, NOT INTRACTABLE: ICD-10-CM

## 2021-06-22 DIAGNOSIS — Z86.69 HISTORY OF GUILLAIN-BARRE SYNDROME: ICD-10-CM

## 2021-06-22 DIAGNOSIS — I10 HYPERTENSION, BENIGN: ICD-10-CM

## 2021-06-22 DIAGNOSIS — Z12.72 SCREENING FOR VAGINAL CANCER: ICD-10-CM

## 2021-06-22 DIAGNOSIS — J45.20 MILD INTERMITTENT ASTHMA WITHOUT COMPLICATION: ICD-10-CM

## 2021-06-22 DIAGNOSIS — L71.9 ROSACEA: ICD-10-CM

## 2021-06-22 DIAGNOSIS — R31.1 BENIGN ESSENTIAL MICROSCOPIC HEMATURIA: ICD-10-CM

## 2021-06-22 DIAGNOSIS — M15.9 PRIMARY OSTEOARTHRITIS INVOLVING MULTIPLE JOINTS: ICD-10-CM

## 2021-06-22 DIAGNOSIS — R73.01 IMPAIRED FASTING GLUCOSE: ICD-10-CM

## 2021-06-22 DIAGNOSIS — Z00.01 ENCOUNTER FOR GENERAL ADULT MEDICAL EXAMINATION WITH ABNORMAL FINDINGS: Primary | ICD-10-CM

## 2021-06-22 DIAGNOSIS — D12.6 TUBULAR ADENOMA OF COLON: ICD-10-CM

## 2021-06-22 DIAGNOSIS — N85.02 ENDOMETRIAL HYPERPLASIA WITH ATYPIA: ICD-10-CM

## 2021-06-22 DIAGNOSIS — J30.9 ALLERGIC RHINITIS, UNSPECIFIED SEASONALITY, UNSPECIFIED TRIGGER: ICD-10-CM

## 2021-06-22 DIAGNOSIS — K21.9 GASTROESOPHAGEAL REFLUX DISEASE, UNSPECIFIED WHETHER ESOPHAGITIS PRESENT: ICD-10-CM

## 2021-06-22 DIAGNOSIS — Z12.31 BREAST CANCER SCREENING BY MAMMOGRAM: ICD-10-CM

## 2021-06-22 DIAGNOSIS — E78.2 MIXED HYPERLIPIDEMIA: ICD-10-CM

## 2021-06-22 DIAGNOSIS — Z78.0 MENOPAUSE: ICD-10-CM

## 2021-06-22 PROBLEM — D25.9 FIBROID UTERUS: Status: RESOLVED | Noted: 2020-03-27 | Resolved: 2021-06-22

## 2021-06-22 PROCEDURE — G0101 CA SCREEN;PELVIC/BREAST EXAM: HCPCS | Performed by: NURSE PRACTITIONER

## 2021-06-22 PROCEDURE — 81003 URINALYSIS AUTO W/O SCOPE: CPT | Performed by: NURSE PRACTITIONER

## 2021-06-22 PROCEDURE — 99214 OFFICE O/P EST MOD 30 MIN: CPT | Performed by: NURSE PRACTITIONER

## 2021-06-22 PROCEDURE — G0439 PPPS, SUBSEQ VISIT: HCPCS | Performed by: NURSE PRACTITIONER

## 2021-06-22 RX ORDER — METRONIDAZOLE 10 MG/G
GEL TOPICAL DAILY
Qty: 60 G | Refills: 5 | Status: SHIPPED | OUTPATIENT
Start: 2021-06-22 | End: 2022-07-06

## 2021-06-22 NOTE — PROGRESS NOTES
Medicare Annual Wellness Visit  Chief Complaint   Patient presents with   • Medicare Wellness-subsequent   • Hypertension   • Rosacea   • Asthma   • Heartburn   • Allergic Rhinitis   • Hyperlipidemia     Subjective     Alla Cole is a 66 y.o. female who presents for an Annual Wellness Visit. In addition, we addressed the following health issues:    She is also following up on multiple chronic medical conditions, including hypertension, rosacea, asthma, allergies, GERD, and hyperlipidemia. She feels well over all. See review of systems below for further information.     Medications    Current Outpatient Medications:   •  albuterol sulfate  (90 Base) MCG/ACT inhaler, Inhale 2 puffs Every 4 (Four) Hours As Needed for Wheezing or Shortness of Air., Disp: 18 g, Rfl: 0  •  aspirin (Liz Low Dose) 81 MG EC tablet, LIZ LOW DOSE 81 MG TBEC, Disp: , Rfl:   •  cholecalciferol (VITAMIN D3) 25 MCG (1000 UT) tablet, Take 4,000 Units by mouth Daily., Disp: , Rfl:   •  Enalapril-hydroCHLOROthiazide 5-12.5 MG per tablet, TAKE 1 TABLET BY MOUTH EVERY DAY, Disp: 30 tablet, Rfl: 0  •  EPINEPHrine (EPIPEN 2-FAWN) 0.3 MG/0.3ML solution auto-injector injection, Inject 0.3 mL into the appropriate muscle as directed by prescriber As Needed (as needed for anaphylaxis)., Disp: 2 each, Rfl: 0  •  famotidine (PEPCID) 40 MG tablet, Take 40 mg by mouth At Night As Needed., Disp: , Rfl:   •  fluticasone (Veramyst) 27.5 MCG/SPRAY nasal spray, into the nostril(s) as directed by provider., Disp: , Rfl:   •  Loratadine (Claritin) 10 MG capsule, Take 1 capsule by mouth., Disp: , Rfl:   •  montelukast (SINGULAIR) 10 MG tablet, TAKE 1 TABLET BY MOUTH EVERY EVENING FOR ALLERGIES, Disp: 90 tablet, Rfl: 2  •  Multiple Vitamins-Minerals (ZINC PO), Take  by mouth., Disp: , Rfl:   •  naproxen sodium (ALEVE) 220 MG tablet, Take 220 mg by mouth 2 (Two) Times a Day As Needed., Disp: , Rfl:   •  nystatin (MYCOSTATIN) 047143 UNIT/ML suspension, Swish  and swallow 5 mL 4 (Four) Times a Day. For 7 - 14 days. Continue treatment for at least 48 hours after symptoms disappear., Disp: 280 mL, Rfl: 0  •  Omega-3 Fatty Acids (FISH OIL) 500 MG capsule capsule, 1 capsule Daily., Disp: , Rfl:   •  Zinc 25 MG tablet, Take 25 mg by mouth Daily., Disp: , Rfl:   •  metroNIDAZOLE (Metrogel) 1 % gel, Apply  topically to the appropriate area as directed Daily., Disp: 60 g, Rfl: 5     Problem List  Patient Active Problem List   Diagnosis   • Rosacea   • Allergic rhinitis   • Asthma   • Endometrial hyperplasia with atypia   • Gastroesophageal reflux disease   • Hearing loss of right ear   • History of Guillain-[place] syndrome   • History of tobacco use   • Hyperlipidemia   • Hypertension, benign   • Impaired fasting glucose   • Menopause   • Migraine headache   • Osteoarthritis   • Class 2 severe obesity due to excess calories with serious comorbidity and body mass index (BMI) of 36.0 to 36.9 in adult (CMS/HCC)   • Benign essential microscopic hematuria   • Diverticulitis   • Tubular adenoma of colon     Surgical History  Past Surgical History:   Procedure Laterality Date   • BREAST BIOPSY Right     benign   • CHOLECYSTECTOMY  2014   • D & C HYSTEROSCOPY  10/01/2015    Dr. Bell   • DIAGNOSTIC LAPAROSCOPY      Dr. Bell   • HYSTEROSCOPY  10/01/2015    Dr. Bell   • ORIF WRIST FRACTURE Right    • RIGHT OOPHORECTOMY      Dr. Bell   • TOTAL LAPAROSCOPIC HYSTERECTOMY WITH DAVINCI ROBOT  2015    Dr. Bell and Dr. Ott   • TYMPANOPLASTY W/ MASTOIDECTOMY        Social History  Social History     Socioeconomic History   • Marital status:      Spouse name: Not on file   • Number of children: Not on file   • Years of education: Not on file   • Highest education level: Not on file   Tobacco Use   • Smoking status: Former Smoker     Packs/day: 0.25     Years: 5.00     Pack years: 1.25     Types: Cigarettes     Quit date:      Years since quittin.5   • Smokeless  tobacco: Never Used   Vaping Use   • Vaping Use: Never used   Substance and Sexual Activity   • Alcohol use: Never   • Drug use: Never      Family History  Family History   Problem Relation Age of Onset   • Hypertension Mother    • Thyroid disease Mother    • Alcohol abuse Mother    • Heart disease Mother    • Anxiety disorder Mother    • Arthritis Mother    • Birth defects Mother    • Stroke Father    • Brain cancer Father    • Heart disease Father    • Cancer Father    • Early death Father    • Hyperlipidemia Father    • Thyroid disease Maternal Grandmother    • Stroke Maternal Grandmother    • Stroke Paternal Grandmother       Health Risk Assessment:  The patient has completed a Health Risk Assessment and it has been reviewed.    Current Medical Providers:  Patient Care Team:  Bárbara Marie APRN as PCP - General  Bárbara Marie APRN as PCP - Family Medicine  Severtson, Mark A, MD as Consulting Physician (Otolaryngology)    Age-appropriate Screening Schedule:  Refer to the list below for future screening recommendations based on patient's age. Orders for these recommended tests are listed in the plan section. The patient has been provided with a written plan.    Health Maintenance   Topic Date Due   • COVID-19 Vaccine (1) Never done   • TDAP/TD VACCINES (1 - Tdap) Never done   • ZOSTER VACCINE (1 of 2) Never done   • Pneumococcal Vaccine 65+ (1 of 1 - PPSV23) Never done   • COLORECTAL CANCER SCREENING  11/05/2020   • ANNUAL WELLNESS VISIT  06/03/2021   • LIPID PANEL  06/03/2021   • DXA SCAN  07/02/2021   • INFLUENZA VACCINE  08/01/2021   • MAMMOGRAM  06/16/2022   • HEPATITIS C SCREENING  Completed     Depression Screen:   PHQ-2/PHQ-9 Depression Screening 6/22/2021   Little interest or pleasure in doing things 0   Feeling down, depressed, or hopeless 0   Trouble falling or staying asleep, or sleeping too much 0   Feeling tired or having little energy 0   Poor appetite or overeating 0   Feeling bad about yourself  - or that you are a failure or have let yourself or your family down 0   Trouble concentrating on things, such as reading the newspaper or watching television 0   Moving or speaking so slowly that other people could have noticed. Or the opposite - being so fidgety or restless that you have been moving around a lot more than usual 0   Thoughts that you would be better off dead, or of hurting yourself in some way 0   Total Score 0   If you checked off any problems, how difficult have these problems made it for you to do your work, take care of things at home, or get along with other people? Not difficult at all     Functional and Cognitive Screening:  Functional & Cognitive Status 6/22/2021   Do you have difficulty preparing food and eating? No   Do you have difficulty bathing yourself, getting dressed or grooming yourself? No   Do you have difficulty using the toilet? No   Do you have difficulty moving around from place to place? No   Do you have trouble with steps or getting out of a bed or a chair? No   Current Diet Well Balanced Diet   Dental Exam Up to date   Eye Exam Up to date   Exercise (times per week) 3 times per week   Current Exercises Include Walking   Current Exercise Activities Include -   Do you need help using the phone?  No   Are you deaf or do you have serious difficulty hearing?  No   Do you need help with transportation? No   Do you need help shopping? No   Do you need help preparing meals?  No   Do you need help with housework?  No   Do you need help with laundry? No   Do you need help taking your medications? No   Do you need help managing money? No   Do you ever drive or ride in a car without wearing a seat belt? No   Have you felt unusual stress, anger or loneliness in the last month? No   Who do you live with? Spouse   If you need help, do you have trouble finding someone available to you? No   Have you been bothered in the last four weeks by sexual problems? No   Do you have difficulty  concentrating, remembering or making decisions? No     Does the patient have evidence of cognitive impairment? No    ATTENTION  What is the year: correct (21)  What is the month of the year: correct (21)  What is the day of the week?: correct (21)  What is the date?: correct (21)  MEMORY  Repeat address three times, only score third attempt: Alex Looney [address] [place] [address]: 7 (21)  HOW MANY ANIMALS DID THE PATIENT NAME  Verbal Fluency -- Animal Names (0-25): 22+ (21)  CLOCK DRAWING  Clock Drawing: All Correct (21)  MEMORY RECALL  Tell me what you remember about that name and address we were repeating at the beginnin (21)  ACE TOTAL SCORE  Total ACE Score - <25/30 strongly suggests cognitive impairment; <21/30 almost certainly shows dementia: 30 (21)    Advanced Care Planning:  ACP discussion was held with the patient during this visit. Patient has an advance directive in EMR which is still valid.     Identification of Risk Factors:  Risk factors include: Breast Cancer/Mammogram Screening  Cardiovascular risk  Colon Cancer Screening  Immunizations Discussed/Encouraged (specific immunizations; Hepatitis A Vaccine/Series, Influenza, Pneumococcal 23, Shingrix and COVID19 )  Obesity/Overweight   Osteoporosis Risk.    Review of Systems   Constitutional: Negative for appetite change, chills, diaphoresis, fatigue, fever, unexpected weight gain and unexpected weight loss.   HENT: Negative for congestion, ear discharge, ear pain, hearing loss, mouth sores, nosebleeds, postnasal drip, rhinorrhea, sinus pressure, sneezing, sore throat, swollen glands and trouble swallowing.    Eyes: Positive for itching. Negative for blurred vision, double vision, pain, discharge and redness.   Respiratory: Negative for apnea, cough, choking, shortness of breath, wheezing and stridor.    Cardiovascular: Negative for chest  "pain, palpitations and leg swelling.   Gastrointestinal: Negative for abdominal distention, abdominal pain, anal bleeding, blood in stool, constipation, diarrhea, nausea, rectal pain, vomiting and indigestion.   Endocrine: Negative for cold intolerance, heat intolerance, polydipsia, polyphagia and polyuria.   Genitourinary: Negative for breast discharge, breast lump, breast pain, difficulty urinating, dysuria, flank pain, frequency, genital sores, hematuria, pelvic pain, urgency, urinary incontinence, vaginal bleeding, vaginal discharge and vaginal pain.   Musculoskeletal: Positive for arthralgias (knees, feet). Negative for back pain, gait problem, joint swelling, myalgias, neck pain and neck stiffness.   Skin: Negative for color change, rash and skin lesions.   Neurological: Negative for dizziness, tremors, seizures, syncope, speech difficulty, weakness, light-headedness, numbness, headache, memory problem and confusion.   Hematological: Negative for adenopathy. Does not bruise/bleed easily.   Psychiatric/Behavioral: Negative for self-injury, sleep disturbance, suicidal ideas, depressed mood and stress. The patient is not nervous/anxious.      Objective     Vitals:    06/22/21 1412   BP: 139/80   BP Location: Right arm   Patient Position: Sitting   Cuff Size: Large Adult   Pulse: 96   Resp: 16   Temp: 97.9 °F (36.6 °C)   TempSrc: Infrared   SpO2: 90%   Weight: 99.8 kg (220 lb)   Height: 166.4 cm (65.5\")         06/22/21  1412   Weight: 99.8 kg (220 lb)     Body mass index is 36.05 kg/m².    Physical Exam  Vitals reviewed.   Constitutional:       General: She is not in acute distress.     Appearance: She is well-developed. She is not diaphoretic.   HENT:      Head: Normocephalic and atraumatic.      Right Ear: Ear canal and external ear normal. Tympanic membrane is scarred. Tympanic membrane is not injected, erythematous or retracted.      Left Ear: Tympanic membrane, ear canal and external ear normal. Tympanic " membrane is not injected, erythematous or retracted.      Nose: Nose normal. No mucosal edema or rhinorrhea.      Right Sinus: No maxillary sinus tenderness or frontal sinus tenderness.      Left Sinus: No maxillary sinus tenderness or frontal sinus tenderness.      Mouth/Throat:      Mouth: No oral lesions.      Pharynx: Uvula midline. No oropharyngeal exudate.   Eyes:      General: Lids are normal.         Right eye: No discharge.         Left eye: No discharge.      Conjunctiva/sclera: Conjunctivae normal.      Pupils: Pupils are equal, round, and reactive to light.   Neck:      Thyroid: No thyroid mass or thyromegaly.      Vascular: No carotid bruit or JVD.      Trachea: No tracheal deviation.   Cardiovascular:      Rate and Rhythm: Normal rate and regular rhythm.      Heart sounds: Normal heart sounds. No murmur heard.   No friction rub. No gallop.    Pulmonary:      Effort: Pulmonary effort is normal. No respiratory distress.      Breath sounds: Normal breath sounds. No wheezing or rales.   Chest:      Breasts: Breasts are symmetrical.         Right: No inverted nipple, mass, nipple discharge, skin change or tenderness.         Left: No inverted nipple, mass, nipple discharge, skin change or tenderness.   Abdominal:      General: Bowel sounds are normal. There is no distension.      Palpations: Abdomen is soft. There is no mass.      Tenderness: There is no abdominal tenderness.      Hernia: No hernia is present.   Genitourinary:     Exam position: Supine.      Labia:         Right: No rash, tenderness or lesion.         Left: No rash, tenderness or lesion.       Vagina: Normal. No vaginal discharge.      Adnexa:         Right: No mass, tenderness or fullness.          Left: No mass, tenderness or fullness.        Rectum: Normal.      Comments: Cervix & uterus are surgically absent.  Musculoskeletal:         General: No deformity. Normal range of motion.      Cervical back: Normal range of motion and neck  supple.   Lymphadenopathy:      Cervical: No cervical adenopathy.      Lower Body: No right inguinal adenopathy. No left inguinal adenopathy.   Skin:     General: Skin is warm and dry.      Findings: No lesion or rash.   Neurological:      Mental Status: She is alert and oriented to person, place, and time.      Cranial Nerves: No cranial nerve deficit.      Sensory: No sensory deficit.      Gait: Gait normal.      Deep Tendon Reflexes: Reflexes are normal and symmetric.   Psychiatric:         Speech: Speech normal.         Behavior: Behavior normal.         Thought Content: Thought content normal.         Judgment: Judgment normal.       RECORDS REVIEWED TODAY:    Lab Results   Component Value Date    WBC 7.4 06/03/2020    HGB 15.0 06/03/2020    HCT 44.5 06/03/2020    MCV 91 06/03/2020     06/03/2020     Lab Results   Component Value Date    GLUCOSE 101 (H) 03/13/2019    BUN 12 06/03/2020    CREATININE 0.70 03/10/2021    EGFRIFNONA 92 06/03/2020    EGFRIFAFRI 106 06/03/2020    BCR 18 06/03/2020    K 4.2 06/03/2020    CO2 26 06/03/2020    CALCIUM 9.9 06/03/2020    PROTENTOTREF 6.9 06/03/2020    ALBUMIN 4.6 06/03/2020    LABIL2 2.0 06/03/2020    AST 22 06/03/2020    ALT 31 06/03/2020     Lab Results   Component Value Date    CHOL 219 (H) 03/13/2019    CHLPL 232 (H) 06/03/2020    TRIG 128 06/03/2020    HDL 70 06/03/2020     (H) 06/03/2020     Lab Results   Component Value Date    TSH 2.39 03/13/2019     No results found for: PSA  Lab Results   Component Value Date    HGBA1C 5.3 06/03/2020       Office Visit on 06/22/2021   Component Date Value Ref Range Status   • Color 06/22/2021 Yellow  Yellow, Straw, Dark Yellow, Jazz Final   • Clarity, UA 06/22/2021 Clear  Clear Final   • Specific Gravity  06/22/2021 1.025  1.005 - 1.030 Final   • pH, Urine 06/22/2021 6.0  5.0 - 8.0 Final   • Leukocytes 06/22/2021 Negative  Negative Final   • Nitrite, UA 06/22/2021 Negative  Negative Final   • Protein, POC  06/22/2021 Negative  Negative mg/dL Final   • Glucose, UA 06/22/2021 Negative  Negative, 1000 mg/dL (3+) mg/dL Final   • Ketones, UA 06/22/2021 Negative  Negative Final   • Urobilinogen, UA 06/22/2021 Normal  Normal Final   • Bilirubin 06/22/2021 Negative  Negative Final   • Blood, UA 06/22/2021 Trace* Negative Final    lysed       Assessment/Plan   Patient Self-Management and Personalized Health Advice  The patient has been provided with information about: diet, exercise, weight management and prevention of cardiac or vascular disease and preventive services including:   · Annual Wellness Visit (AWV)  · Bone Density Measurements  · Colorectal Cancer Screening, Colonoscopy  · Screening Mammography   · Screening Pap Tests  · Screening Pelvic Examinations (Includes a clinical breast examination).    Discussed the patient's BMI with her. The BMI is above average; BMI management plan is completed.    Diagnoses and all orders for this visit:    1. Encounter for general adult medical examination with abnormal findings (Primary)  Comments:  DIscussed age appropriate health maintenance. She declines all immunizations at this time.    2. Hypertension, benign  Assessment & Plan:  Stable.  Follow-up in 6 months.     Orders:  -     POC Urinalysis Dipstick, Automated  -     CBC & Differential  -     Lipid Panel    3. Mixed hyperlipidemia  Assessment & Plan:  Will call with lipid results and further recommendations.       Orders:  -     Comprehensive Metabolic Panel  -     Lipid Panel    4. Rosacea  Assessment & Plan:  Trial of topical metronidazole.       5. Impaired fasting glucose  Assessment & Plan:  A1c was normal last year.         6. Tubular adenoma of colon  Assessment & Plan:  Colonoscopy is due.   She has GSI packet and will get colonoscopy scheduled.     Orders:  -     Ambulatory Referral For Screening Colonoscopy    7. Allergic rhinitis, unspecified seasonality, unspecified trigger  Assessment & Plan:  Recommended use  of Zaditor or Patanol eye drops as needed.       8. Class 2 severe obesity due to excess calories with serious comorbidity and body mass index (BMI) of 36.0 to 36.9 in adult (CMS/[facility])  Assessment & Plan:  Encouraged healthy diet, regular exercise, weight loss.   She has started going to the [facility] again.       9. Gastroesophageal reflux disease, unspecified whether esophagitis present  Assessment & Plan:  Controlled with Pepcid use.       10. Endometrial hyperplasia with atypia  Assessment & Plan:  Pap of vaginal tissue conducted today.       11. Benign essential microscopic hematuria  Assessment & Plan:  She has had past evaluation by Urology.   No worsening. Will continue to monitor.       12. Primary osteoarthritis involving multiple joints  Assessment & Plan:  Followed by Podiatry.       13. History of Guillain-[place] syndrome  Assessment & Plan:  Reviewed immunization recommendations and precautions from the CDC.   Tdap and TD are not recommended.   Influenza is at patient's discretion.   Recommended Pneumonia & COVID vaccines - she declines at this time.       14. Chronic migraine without aura without status migrainosus, not intractable  Assessment & Plan:  No recent issues with headaches.         15. Mild intermittent asthma without complication  Assessment & Plan:  Has not need rescue inhaler in the last year, but has one available if needed.           16. Immunity status testing  Comments:  She requests COVID antibiody testing.  Orders:  -     SARS-CoV-2 Antibodies (Roche); Future    17. Menopause  Assessment & Plan:  Normal DEXA in 2019, due for repeat now.       Orders:  -     DEXA Bone Density Axial; Future    18. Breast cancer screening by mammogram  -     Mammo Screening Digital Tomosynthesis Bilateral With CAD; Future    19. Screening for vaginal cancer  -     Liquid-based Pap Smear, Screening    Other orders  -     Cancel: Liquid-based Pap Smear, Screening  -     Cancel: Hemoglobin A1c  -      metroNIDAZOLE (Metrogel) 1 % gel; Apply  topically to the appropriate area as directed Daily.  Dispense: 60 g; Refill: 5      Orders:  Orders Placed This Encounter   Procedures   • Mammo Screening Digital Tomosynthesis Bilateral With CAD   • DEXA Bone Density Axial   • Comprehensive Metabolic Panel   • Lipid Panel   • SARS-CoV-2 Antibodies (Roche)   • Ambulatory Referral For Screening Colonoscopy   • POC Urinalysis Dipstick, Automated   • CBC & Differential       Follow Up:  Return in about 6 months (around 12/22/2021) for Follow-Up hypertension.     An After Visit Summary and PPPS with all of these plans were given to the patient.

## 2021-06-23 NOTE — ASSESSMENT & PLAN NOTE
Reviewed immunization recommendations and precautions from the CDC.   Tdap and TD are not recommended.   Influenza is at patient's discretion.   Recommended Pneumonia & COVID vaccines - she declines at this time.

## 2021-06-23 NOTE — ASSESSMENT & PLAN NOTE
Encouraged healthy diet, regular exercise, weight loss.   She has started going to the [facility] again.

## 2021-06-24 LAB
CYTOLOGIST CVX/VAG CYTO: NORMAL
CYTOLOGY CVX/VAG DOC CYTO: NORMAL
DX ICD CODE: NORMAL
HIV 1 & 2 AB SER-IMP: NORMAL
OTHER STN SPEC: NORMAL
STAT OF ADQ CVX/VAG CYTO-IMP: NORMAL

## 2021-06-26 LAB
ALBUMIN SERPL-MCNC: 4.5 G/DL (ref 3.8–4.8)
ALBUMIN/GLOB SERPL: 2 {RATIO} (ref 1.2–2.2)
ALP SERPL-CCNC: 67 IU/L (ref 48–121)
ALT SERPL-CCNC: 24 IU/L (ref 0–32)
AST SERPL-CCNC: 23 IU/L (ref 0–40)
BASOPHILS # BLD AUTO: 0.1 X10E3/UL (ref 0–0.2)
BASOPHILS NFR BLD AUTO: 1 %
BILIRUB SERPL-MCNC: 0.4 MG/DL (ref 0–1.2)
BUN SERPL-MCNC: 15 MG/DL (ref 8–27)
BUN/CREAT SERPL: 20 (ref 12–28)
CALCIUM SERPL-MCNC: 9.8 MG/DL (ref 8.7–10.3)
CHLORIDE SERPL-SCNC: 102 MMOL/L (ref 96–106)
CHOLEST SERPL-MCNC: 233 MG/DL (ref 100–199)
CO2 SERPL-SCNC: 26 MMOL/L (ref 20–29)
CREAT SERPL-MCNC: 0.76 MG/DL (ref 0.57–1)
EOSINOPHIL # BLD AUTO: 0.2 X10E3/UL (ref 0–0.4)
EOSINOPHIL NFR BLD AUTO: 3 %
ERYTHROCYTE [DISTWIDTH] IN BLOOD BY AUTOMATED COUNT: 13 % (ref 11.7–15.4)
GLOBULIN SER CALC-MCNC: 2.2 G/DL (ref 1.5–4.5)
GLUCOSE SERPL-MCNC: 102 MG/DL (ref 65–99)
HCT VFR BLD AUTO: 44.6 % (ref 34–46.6)
HDLC SERPL-MCNC: 67 MG/DL
HGB BLD-MCNC: 14.7 G/DL (ref 11.1–15.9)
IMM GRANULOCYTES # BLD AUTO: 0 X10E3/UL (ref 0–0.1)
IMM GRANULOCYTES NFR BLD AUTO: 0 %
LDLC SERPL CALC-MCNC: 146 MG/DL (ref 0–99)
LYMPHOCYTES # BLD AUTO: 1.6 X10E3/UL (ref 0.7–3.1)
LYMPHOCYTES NFR BLD AUTO: 25 %
MCH RBC QN AUTO: 29.6 PG (ref 26.6–33)
MCHC RBC AUTO-ENTMCNC: 33 G/DL (ref 31.5–35.7)
MCV RBC AUTO: 90 FL (ref 79–97)
MONOCYTES # BLD AUTO: 0.4 X10E3/UL (ref 0.1–0.9)
MONOCYTES NFR BLD AUTO: 7 %
NEUTROPHILS # BLD AUTO: 4.1 X10E3/UL (ref 1.4–7)
NEUTROPHILS NFR BLD AUTO: 64 %
PLATELET # BLD AUTO: 301 X10E3/UL (ref 150–450)
POTASSIUM SERPL-SCNC: 4.6 MMOL/L (ref 3.5–5.2)
PROT SERPL-MCNC: 6.7 G/DL (ref 6–8.5)
RBC # BLD AUTO: 4.96 X10E6/UL (ref 3.77–5.28)
SODIUM SERPL-SCNC: 142 MMOL/L (ref 134–144)
TRIGL SERPL-MCNC: 115 MG/DL (ref 0–149)
VLDLC SERPL CALC-MCNC: 20 MG/DL (ref 5–40)
WBC # BLD AUTO: 6.4 X10E3/UL (ref 3.4–10.8)

## 2021-06-29 ENCOUNTER — HOSPITAL ENCOUNTER (OUTPATIENT)
Dept: BONE DENSITY | Facility: HOSPITAL | Age: 66
Discharge: HOME | End: 2021-06-29
Admitting: NURSE PRACTITIONER

## 2021-06-29 DIAGNOSIS — Z78.0 MENOPAUSE: ICD-10-CM

## 2021-06-29 PROCEDURE — 77080 DXA BONE DENSITY AXIAL: CPT

## 2021-06-30 ENCOUNTER — HOSPITAL ENCOUNTER (OUTPATIENT)
Dept: MAMMOGRAPHY | Facility: HOSPITAL | Age: 66
Discharge: HOME | End: 2021-06-30
Admitting: NURSE PRACTITIONER

## 2021-06-30 DIAGNOSIS — Z12.31 BREAST CANCER SCREENING BY MAMMOGRAM: ICD-10-CM

## 2021-06-30 PROCEDURE — 77067 SCR MAMMO BI INCL CAD: CPT

## 2021-06-30 PROCEDURE — 77063 BREAST TOMOSYNTHESIS BI: CPT

## 2021-09-03 RX ORDER — ENALAPRIL MALEATE AND HYDROCHLOROTHIAZIDE 5; 12.5 MG/1; MG/1
TABLET ORAL
Qty: 30 TABLET | Refills: 1 | Status: SHIPPED | OUTPATIENT
Start: 2021-09-03 | End: 2021-10-04

## 2021-09-12 ENCOUNTER — TELEMEDICINE (OUTPATIENT)
Dept: FAMILY MEDICINE CLINIC | Facility: TELEHEALTH | Age: 66
End: 2021-09-12

## 2021-09-12 DIAGNOSIS — K57.32 DIVERTICULITIS OF LARGE INTESTINE WITHOUT PERFORATION OR ABSCESS WITHOUT BLEEDING: Primary | ICD-10-CM

## 2021-09-12 PROCEDURE — 99213 OFFICE O/P EST LOW 20 MIN: CPT | Performed by: NURSE PRACTITIONER

## 2021-09-12 RX ORDER — METRONIDAZOLE 500 MG/1
500 TABLET ORAL 3 TIMES DAILY
Qty: 30 TABLET | Refills: 0 | Status: SHIPPED | OUTPATIENT
Start: 2021-09-12 | End: 2021-09-22

## 2021-09-12 RX ORDER — CIPROFLOXACIN 750 MG/1
750 TABLET, FILM COATED ORAL 2 TIMES DAILY
Qty: 20 TABLET | Refills: 0 | Status: SHIPPED | OUTPATIENT
Start: 2021-09-12 | End: 2021-09-22

## 2021-09-12 RX ORDER — ONDANSETRON 4 MG/1
4 TABLET, ORALLY DISINTEGRATING ORAL EVERY 8 HOURS PRN
Qty: 20 TABLET | Refills: 0 | Status: SHIPPED | OUTPATIENT
Start: 2021-09-12 | End: 2023-02-02

## 2021-09-12 NOTE — PATIENT INSTRUCTIONS
If pain worsens, fever increases or you develop new symptoms, please go to the emergency department.   Call tomorrow and follow up with your primary care provider next week for a recheck.   Stop taking the multivitamins and Emergen C while taking Cipro.       Diverticulitis    Diverticulitis is when small pouches in your colon (large intestine) get infected or swollen. This causes pain in the belly (abdomen) and watery poop (diarrhea).  These pouches are called diverticula. The pouches form in people who have a condition called diverticulosis.  What are the causes?  This condition may be caused by poop (stool) that gets trapped in the pouches in your colon. The poop lets germs (bacteria) grow in the pouches. This causes the infection.  What increases the risk?  You are more likely to get this condition if you have small pouches in your colon. The risk is higher if:  · You are overweight or very overweight (obese).  · You do not exercise enough.  · You drink alcohol.  · You smoke or use products with tobacco in them.  · You eat a diet that has a lot of red meat such as beef, pork, or lamb.  · You eat a diet that does not have enough fiber in it.  · You are older than 40 years of age.  What are the signs or symptoms?  · Pain in the belly. Pain is often on the left side, but it may be in other areas.  · Fever and feeling cold.  · Feeling like you may vomit.  · Vomiting.  · Having cramps.  · Feeling full.  · Changes to how often you poop.  · Blood in your poop.  How is this treated?  Most cases are treated at home by:  · Taking over-the-counter pain medicines.  · Following a clear liquid diet.  · Taking antibiotic medicines.  · Resting.  Very bad cases may need to be treated at a hospital. This may include:  · Not eating or drinking.  · Taking prescription pain medicine.  · Getting antibiotic medicines through an IV tube.  · Getting fluid and food through an IV tube.  · Having surgery.  When you are feeling better, your  doctor may tell you to have a test to check your colon (colonoscopy).  Follow these instructions at home:  Medicines  · Take over-the-counter and prescription medicines only as told by your doctor. These include:  ? Antibiotics.  ? Pain medicines.  ? Fiber pills.  ? Probiotics.  ? Stool softeners.  · If you were prescribed an antibiotic medicine, take it as told by your doctor. Do not stop taking the antibiotic even if you start to feel better.  · Ask your doctor if the medicine prescribed to you requires you to avoid driving or using machinery.  Eating and drinking    · Follow a diet as told by your doctor.  · When you feel better, your doctor may tell you to change your diet. You may need to eat a lot of fiber. Fiber makes it easier to poop (have a bowel movement). Foods with fiber include:  ? Berries.  ? Beans.  ? Lentils.  ? Green vegetables.  · Avoid eating red meat.    General instructions  · Do not use any products that contain nicotine or tobacco, such as cigarettes, e-cigarettes, and chewing tobacco. If you need help quitting, ask your doctor.  · Exercise 3 or more times a week. Try to get 30 minutes each time. Exercise enough to sweat and make your heart beat faster.  · Keep all follow-up visits as told by your doctor. This is important.  Contact a doctor if:  · Your pain does not get better.  · You are not pooping like normal.  Get help right away if:  · Your pain gets worse.  · Your symptoms do not get better.  · Your symptoms get worse very fast.  · You have a fever.  · You vomit more than one time.  · You have poop that is:  ? Bloody.  ? Black.  ? Tarry.  Summary  · This condition happens when small pouches in your colon get infected or swollen.  · Take medicines only as told by your doctor.  · Follow a diet as told by your doctor.  · Keep all follow-up visits as told by your doctor. This is important.  This information is not intended to replace advice given to you by your health care provider. Make  sure you discuss any questions you have with your health care provider.  Document Revised: 09/28/2020 Document Reviewed: 09/28/2020  Elsevier Patient Education © 2021 [employer]    Metronidazole tablets or capsules  What is this medicine?  METRONIDAZOLE (me trojung NI da zole) is an antiinfective. It is used to treat certain kinds of bacterial and protozoal infections. It will not work for colds, flu, or other viral infections.  This medicine may be used for other purposes; ask your health care provider or pharmacist if you have questions.  COMMON BRAND NAME(S): Flagyl  What should I tell my health care provider before I take this medicine?  They need to know if you have any of these conditions:  · Cockayne syndrome  · history of blood diseases such as sickle cell anemia, anemia, or leukemia  · if you often drink alcohol  · irregular heartbeat or rhythm  · kidney disease  · liver disease  · yeast or fungal infection  · an unusual or allergic reaction to metronidazole, nitroimidazoles, or other medicines, foods, dyes, or preservatives  · pregnant or trying to get pregnant  · breast-feeding  How should I use this medicine?  Take this medicine by mouth with water. Take it as directed on the prescription label at the same time every day. Take all of this medicine unless your health care provider tells you to stop it early. Keep taking it even if you think you are better.  Talk to your health care provider about the use of this medicine in children. While it may be prescribed for children for selected conditions, precautions do apply.  Overdosage: If you think you have taken too much of this medicine contact a poison control center or emergency room at once.  NOTE: This medicine is only for you. Do not share this medicine with others.  What if I miss a dose?  If you miss a dose, take it as soon as you can. If it is almost time for your next dose, take only that dose. Do not take double or extra doses.  What may interact  with this medicine?  Do not take this medicine with any of the following medications:  · alcohol or any product that contains alcohol  · cisapride  · disulfiram  · dronedarone  · pimozide  · thioridazine  This medicine may also interact with the following medications:  · birth control pills  · busulfan  · carbamazepine  · certain medicines that treat or prevent blood clots like warfarin  · cimetidine  · lithium  · other medicines that prolong the QT interval (cause an abnormal heart rhythm)  · phenobarbital  · phenytoin  This list may not describe all possible interactions. Give your health care provider a list of all the medicines, herbs, non-prescription drugs, or dietary supplements you use. Also tell them if you smoke, drink alcohol, or use illegal drugs. Some items may interact with your medicine.  What should I watch for while using this medicine?  Tell your health care provider if your symptoms do not start to get better or if they get worse.  Some products may contain alcohol. Ask your health care provider if this medicine contains alcohol. Be sure to tell all health care providers you are taking this medicine. Certain medicines, such as metronidazole and disulfiram, can cause an unpleasant reaction when taken with alcohol. The reaction includes flushing, headache, nausea, vomiting, sweating, and increased thirst. The reaction can last from 30 minutes to several hours.  If you are being treated for a sexually transmitted disease (STD), avoid sexual contact until you have finished your treatment. Your sexual partner may also need treatment.  Birth control may not work properly while you are taking this medicine. Talk to your health care provider about using an extra method of birth control.  What side effects may I notice from receiving this medicine?  Side effects that you should report to your doctor or health care professional as soon as possible:  · allergic reactions like skin rash, itching or hives,  swelling of the face, lips, or tongue  · confusion  · fast, irregular heartbeat  · light-colored stool  · liver injury (dark yellow or brown urine; general ill feeling or flu-like symptoms; loss of appetite, right upper belly pain; unusually weak or tired, yellowing of the eyes or skin)  · pain, tingling, numbness in the hands or feet  · rash, fever, and swollen lymph nodes  · redness, blistering, peeling or loosening of the skin, including inside the mouth  · seizures  · unusual vaginal discharge, itching, or odor  Side effects that usually do not require medical attention (report to your doctor or health care professional if they continue or are bothersome):  · change in taste  · diarrhea  · headache  · nausea  · stomach pain  · vomiting  This list may not describe all possible side effects. Call your doctor for medical advice about side effects. You may report side effects to FDA at 7-711-FDA-9166.  Where should I keep my medicine?  Keep out of the reach of children and pets.  Store between 15 and 25 degrees C (59 and 77 degrees F). Protect from light. Get rid of any unused medicine after the expiration date.  To get rid of medicines that are no longer needed or have :  · Take the medicine to a medicine take-back program. Check with your pharmacy or law enforcement to find a location.  · If you cannot return the medicine, check the label or package insert to see if the medicine should be thrown out in the garbage or flushed down the toilet. If you are not sure, ask your health care provider. If it is safe to put it in the trash, take the medicine out of the container. Mix the medicine with cat litter, dirt, coffee grounds, or other unwanted substance. Seal the mixture in a bag or container. Put it in the trash.  NOTE: This sheet is a summary. It may not cover all possible information. If you have questions about this medicine, talk to your doctor, pharmacist, or health care provider.  ©  [employer] (2021-03-23 09:24:11)  Ciprofloxacin injection  What is this medicine?  CIPROFLOXACIN (sip kaelyn FLOX a sin) is a quinolone antibiotic. It is used to treat certain kinds of bacterial infections. It will not work for colds, flu, or other viral infections.  This medicine may be used for other purposes; ask your health care provider or pharmacist if you have questions.  COMMON BRAND NAME(S): Cipro  What should I tell my health care provider before I take this medicine?  They need to know if you have any of these conditions:  · bone problems  · diabetes  · heart disease  · high blood pressure  · history of irregular heartbeat  · history of low levels of potassium in the blood  · joint problems  · kidney disease  · liver disease  · mental illness  · myasthenia gravis  · seizures  · tendon problems  · tingling of the fingers or toes, or other nerve disorder  · an unusual or allergic reaction to ciprofloxacin, other antibiotics or medicines, foods, dyes, or preservatives  · pregnant or trying to get pregnant  · breast-feeding  How should I use this medicine?  The medicine is for infusion into a vein. It is usually given by a health care professional in a hospital or clinic setting.  If you get this medicine at home, you will be taught how to prepare and give this medicine. Use exactly as directed. Take your medicine at regular intervals. Do not take your medicine more often than directed.  It is important that you put your used needles and syringes in a special sharps container. Do not put them in a trash can. If you do not have a sharps container, call your pharmacist or healthcare provider to get one.  A special MedGuide will be given to you by the pharmacist with each prescription and refill. Be sure to read this information carefully each time.  Talk to your pediatrician regarding the use of this medicine in children. Special care may be needed.  Overdosage: If you think you have taken too much of this medicine  contact a poison control center or emergency room at once.  NOTE: This medicine is only for you. Do not share this medicine with others.  What if I miss a dose?  If you miss a dose, use it as soon as you can. If it is almost time for your next dose, use only that dose. Do not use double or extra doses.  What may interact with this medicine?  Do not take this medicine with any of the following medications:  · cisapride  · dronedarone  · flibanserin  · lomitapide  · pimozide  · thioridazine  · tizanidine  This medicine may also interact with the following medications:  · birth control pills  · caffeine  · certain medicines for diabetes, like glipizide, glyburide, or insulin  · certain medicines that treat or prevent blood clots like warfarin  · clozapine  · cyclosporine  · dofetilide  · duloxetine  · lidocaine  · methotrexate  · NSAIDS, medicines for pain and inflammation, like ibuprofen or naproxen  · olanzapine  · omeprazole  · other medicines that prolong the QT interval (cause an abnormal heart rhythm)  · phenytoin  · probenecid  · ropinirole  · sildenafil  · theophylline  · ziprasidone  · zolpidem  This list may not describe all possible interactions. Give your health care provider a list of all the medicines, herbs, non-prescription drugs, or dietary supplements you use. Also tell them if you smoke, drink alcohol, or use illegal drugs. Some items may interact with your medicine.  What should I watch for while using this medicine?  Tell your doctor or health care provider if your symptoms do not start to get better or if they get worse.  This medicine may cause serious skin reactions. They can happen weeks to months after starting the medicine. Contact your health care provider right away if you notice fevers or flu-like symptoms with a rash. The rash may be red or purple and then turn into blisters or peeling of the skin. Or, you might notice a red rash with swelling of the face, lips or lymph nodes in your neck  or under your arms.  Do not treat diarrhea with over-the-counter products. Contact your doctor if you have diarrhea that lasts more than 2 days or if the diarrhea is severe and watery.  Check with your doctor or health care provider if you get an attack of severe diarrhea, nausea and vomiting, or if you sweat a lot. The loss of too much body fluid can make it dangerous for you to take this medicine.  This medicine may increase blood sugar. Ask your health care provider if changes in diet or medicines are needed if you have diabetes.  You may get drowsy or dizzy. Do not drive, use machinery, or do anything that needs mental alertness until you know how this medicine affects you. Do not stand or sit up quickly, especially if you are an older patient. This reduces the risk of dizzy or fainting spells.  This medicine can make you more sensitive to the sun. Keep out of the sun. If you cannot avoid being in the sun, wear protective clothing and use sunscreen. Do not use sun lamps or tanning beds/booths.  What side effects may I notice from receiving this medicine?  Side effects that you should report to your doctor or health care professional as soon as possible:  · allergic reactions like skin rash or hives, swelling of the face, lips, or tongue  · anxious  · bloody or watery diarrhea  · confusion  · depressed mood  · fast, irregular heartbeat  · fever  · hallucination, loss of contact with reality  · joint, muscle, or tendon pain or swelling  · loss of memory  · pain, tingling, numbness in the hands or feet  · redness, blistering, peeling or loosening of the skin, including inside the mouth  · seizures  · signs and symptoms of aortic dissection such as sudden chest, stomach, or back pain  · signs and symptoms of high blood sugar such as being more thirsty or hungry or having to urinate more than normal. You may feel very tired or have blurry vision.  · signs and symptoms of liver injury like dark yellow or brown urine;  general ill feeling or flu-like symptoms; light-colored stools; loss of appetite; nausea; right upper belly pain; unusually weak or tired; yellowing of the eyes or skin  · signs and symptoms of low blood sugar such as feeling anxious; confusion; dizziness; increased hunger; unusually weak or tired; sweating; shakiness; cold; irritable; headache; blurred vision; fast heartbeat; loss of consciousness; pale skin  · suicidal thoughts or other mood changes  · sunburn  · unusually weak or tired  Side effects that usually do not require medical attention (report to your doctor or health care professional if they continue or are bothersome):  · dry mouth  · headache  · nausea  · trouble sleeping  This list may not describe all possible side effects. Call your doctor for medical advice about side effects. You may report side effects to FDA at 5-343-FDA-6832.  Where should I keep my medicine?  Keep out of the reach of children.  If you are using this medicine at home, you will be instructed on how to store this medicine. Throw away any unused medicine after the expiration date on the label.  NOTE: This sheet is a summary. It may not cover all possible information. If you have questions about this medicine, talk to your doctor, pharmacist, or health care provider.  © 2021 [employer] (2020-03-19 11:21:56)

## 2021-09-12 NOTE — PROGRESS NOTES
CHIEF COMPLAINT  Chief Complaint   Patient presents with   • Diverticulitis         HPI  Alla Cole is a 66 y.o. female  presents with complaint of diverticulitis. She has this back in February and was treated with Augmentin for sinus infection and the pain resolved. She was seen by her pcp in March with the same lower abdominal pain and low grade fever then was treatment with Cipro and Flagyl and the pain resolved.   She did have a CT showing diverticulitis.   Over the weekend she has developed low grade fever of 100 and lower abdominal pain which she describes as the same pain she had in February and March.   She reports the Flagyl makes her sick and needs to have this to take before taking the Flagyl.   She is caring for her  who has COVID-19. She reports past infection around February. She will call her primary care provider tomorrow, but did not want to wait and did not have anyone to stay with her .     Review of Systems   Constitutional: Positive for fever. Negative for chills, diaphoresis and fatigue.   Gastrointestinal: Positive for abdominal pain. Negative for diarrhea, nausea and vomiting.       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   • Chronic sinusitis    • Colon polyp    • Diverticulitis 3/10/2021   • GERD (gastroesophageal reflux disease)    • Hearing loss    • Hypertension    • Impaired fasting glucose    • Microscopic hematuria    • Migraine headache    • Rosacea 2/17/2020   • Tubular adenoma of colon 3/18/2021   • Vitreous degeneration        Family History   Problem Relation Age of Onset   • Hypertension Mother    • Thyroid disease Mother    • Alcohol abuse Mother    • Heart disease Mother    • Anxiety disorder Mother    • Arthritis Mother    • Birth defects Mother    • Stroke Father    • Brain cancer Father    • Heart disease Father    • Cancer Father    • Early death  Father    • Hyperlipidemia Father    • Thyroid disease Maternal Grandmother    • Stroke Maternal Grandmother    • Stroke Paternal Grandmother        Social History     Socioeconomic History   • Marital status:      Spouse name: Not on file   • Number of children: Not on file   • Years of education: Not on file   • Highest education level: Not on file   Tobacco Use   • Smoking status: Former Smoker     Packs/day: 0.25     Years: 5.00     Pack years: 1.25     Types: Cigarettes     Quit date:      Years since quittin.7   • Smokeless tobacco: Never Used   Vaping Use   • Vaping Use: Never used   Substance and Sexual Activity   • Alcohol use: Never   • Drug use: Never         There were no vitals taken for this visit.    PHYSICAL EXAM  Physical Exam   Constitutional: She is oriented to person, place, and time. She has a sickly appearance. She appears ill. She appears distressed. She appears overweight.  HENT:   Head: Normocephalic and atraumatic.   Pulmonary/Chest: Effort normal.   Abdominal: Soft. She exhibits no distension. There is abdominal tenderness (patient reports across lower abdomen).   Neurological: She is alert and oriented to person, place, and time.       Results for orders placed or performed in visit on 21   SARS-CoV-2 Antibodies (Roche)    Specimen: Blood   Result Value Ref Range    SARS-COV-2 ANTIBODIES Positive Negative       Diagnoses and all orders for this visit:    1. Diverticulitis of large intestine without perforation or abscess without bleeding (Primary)    Other orders  -     ciprofloxacin (CIPRO) 750 MG tablet; Take 1 tablet by mouth 2 (Two) Times a Day for 10 days. [facility][phone]  Dispense: 20 tablet; Refill: 0  -     metroNIDAZOLE (Flagyl) 500 MG tablet; Take 1 tablet by mouth 3 (Three) Times a Day for 10 days.  Dispense: 30 tablet; Refill: 0  -     ondansetron ODT (Zofran ODT) 4 MG disintegrating tablet; Place 1  tablet on the tongue Every 8 (Eight) Hours As Needed for Nausea or Vomiting.  Dispense: 20 tablet; Refill: 0          FOLLOW-UP  As discussed during visit with PCP/[facility] if no improvement or Urgent Care/Emergency Department if worsening of symptoms    Patient verbalizes understanding of medication dosage, comfort measures, instructions for treatment and follow-up.    VIRGIL Paul  09/12/2021  16:29 EDT    This visit was performed via Telehealth.  This patient has been instructed to follow-up with their primary care provider if their symptoms worsen or the treatment provided does not resolve their illness.

## 2021-10-04 RX ORDER — ENALAPRIL MALEATE AND HYDROCHLOROTHIAZIDE 5; 12.5 MG/1; MG/1
TABLET ORAL
Qty: 90 TABLET | Refills: 0 | Status: SHIPPED | OUTPATIENT
Start: 2021-10-04 | End: 2021-12-27 | Stop reason: SDUPTHER

## 2021-12-27 RX ORDER — ENALAPRIL MALEATE AND HYDROCHLOROTHIAZIDE 5; 12.5 MG/1; MG/1
1 TABLET ORAL DAILY
Qty: 90 TABLET | Refills: 0 | Status: SHIPPED | OUTPATIENT
Start: 2021-12-27 | End: 2022-01-06 | Stop reason: SDUPTHER

## 2022-01-06 ENCOUNTER — OFFICE VISIT (OUTPATIENT)
Dept: FAMILY MEDICINE CLINIC | Facility: CLINIC | Age: 67
End: 2022-01-06

## 2022-01-06 VITALS
HEART RATE: 84 BPM | RESPIRATION RATE: 18 BRPM | WEIGHT: 213 LBS | SYSTOLIC BLOOD PRESSURE: 139 MMHG | BODY MASS INDEX: 34.23 KG/M2 | DIASTOLIC BLOOD PRESSURE: 75 MMHG | OXYGEN SATURATION: 99 % | TEMPERATURE: 97.1 F | HEIGHT: 66 IN

## 2022-01-06 DIAGNOSIS — E66.09 CLASS 1 OBESITY DUE TO EXCESS CALORIES WITH SERIOUS COMORBIDITY AND BODY MASS INDEX (BMI) OF 34.0 TO 34.9 IN ADULT: ICD-10-CM

## 2022-01-06 DIAGNOSIS — K57.92 DIVERTICULITIS: ICD-10-CM

## 2022-01-06 DIAGNOSIS — D12.6 TUBULAR ADENOMA OF COLON: ICD-10-CM

## 2022-01-06 DIAGNOSIS — I10 HYPERTENSION, BENIGN: Primary | ICD-10-CM

## 2022-01-06 DIAGNOSIS — M54.16 LUMBAR RADICULOPATHY: ICD-10-CM

## 2022-01-06 DIAGNOSIS — R73.01 IMPAIRED FASTING GLUCOSE: ICD-10-CM

## 2022-01-06 DIAGNOSIS — E78.2 MIXED HYPERLIPIDEMIA: ICD-10-CM

## 2022-01-06 PROBLEM — E66.811 CLASS 1 OBESITY DUE TO EXCESS CALORIES WITH SERIOUS COMORBIDITY AND BODY MASS INDEX (BMI) OF 34.0 TO 34.9 IN ADULT: Status: ACTIVE | Noted: 2020-06-03

## 2022-01-06 PROCEDURE — 99214 OFFICE O/P EST MOD 30 MIN: CPT | Performed by: NURSE PRACTITIONER

## 2022-01-06 RX ORDER — ENALAPRIL MALEATE AND HYDROCHLOROTHIAZIDE 5; 12.5 MG/1; MG/1
1 TABLET ORAL DAILY
Qty: 90 TABLET | Refills: 0 | Status: SHIPPED | OUTPATIENT
Start: 2022-01-06 | End: 2022-02-01 | Stop reason: SDUPTHER

## 2022-01-06 NOTE — PROGRESS NOTES
Chief Complaint  Hypertension    Subjective          History of Present Illness  Patient is following up on multiple chronic medical conditions, including hypertension, hyperlipidemia, impaired fasting glucose, and diverticulitis. She had another flare of diverticulitis in September and was treated via telehealth, no further symptoms since then. She is going to the [facility] regularly and is trying to eat healthier. She has occasional lower back pain that radiates to right leg with brief tingling/numbness in the leg, but it is improving with exercise and weight loss. No loss of bowel or bladder control.         Current Outpatient Medications:   •  albuterol sulfate  (90 Base) MCG/ACT inhaler, Inhale 2 puffs Every 4 (Four) Hours As Needed for Wheezing or Shortness of Air., Disp: 18 g, Rfl: 0  •  aspirin (Liz Low Dose) 81 MG EC tablet, LIZ LOW DOSE 81 MG TBEC, Disp: , Rfl:   •  cholecalciferol (VITAMIN D3) 25 MCG (1000 UT) tablet, Take 4,000 Units by mouth Daily., Disp: , Rfl:   •  Enalapril-hydroCHLOROthiazide 5-12.5 MG per tablet, Take 1 tablet by mouth Daily., Disp: 90 tablet, Rfl: 0  •  EPINEPHrine (EPIPEN 2-FAWN) 0.3 MG/0.3ML solution auto-injector injection, Inject 0.3 mL into the appropriate muscle as directed by prescriber As Needed (as needed for anaphylaxis)., Disp: 2 each, Rfl: 0  •  famotidine (PEPCID) 40 MG tablet, Take 40 mg by mouth At Night As Needed., Disp: , Rfl:   •  Loratadine (Claritin) 10 MG capsule, Take 1 capsule by mouth., Disp: , Rfl:   •  Multiple Vitamins-Minerals (EMERGEN-C IMMUNE PO), , Disp: , Rfl:   •  Multiple Vitamins-Minerals (ZINC PO), Take  by mouth., Disp: , Rfl:   •  Omega-3 Fatty Acids (FISH OIL) 500 MG capsule capsule, 1 capsule Daily., Disp: , Rfl:   •  Zinc 25 MG tablet, Take 25 mg by mouth Daily., Disp: , Rfl:   •  fluticasone (Veramyst) 27.5 MCG/SPRAY nasal spray, into the nostril(s) as directed by provider., Disp: , Rfl:   •  metroNIDAZOLE (Metrogel) 1 % gel, Apply   "topically to the appropriate area as directed Daily., Disp: 60 g, Rfl: 5  •  ondansetron ODT (Zofran ODT) 4 MG disintegrating tablet, Place 1 tablet on the tongue Every 8 (Eight) Hours As Needed for Nausea or Vomiting., Disp: 20 tablet, Rfl: 0     Review of Systems   Constitutional: Negative for activity change, appetite change, diaphoresis, fatigue, unexpected weight gain and unexpected weight loss.   Respiratory: Negative for cough and shortness of breath.    Cardiovascular: Negative for chest pain and palpitations.   Gastrointestinal: Negative for abdominal pain, constipation, diarrhea, nausea and vomiting.   Neurological: Negative for dizziness, syncope and headache.        Objective   Vital Signs:   Vitals:    01/06/22 1018   BP: 139/75   BP Location: Left arm   Patient Position: Sitting   Cuff Size: Large Adult   Pulse: 84   Resp: 18   Temp: 97.1 °F (36.2 °C)   TempSrc: Infrared   SpO2: 99%   Weight: 96.6 kg (213 lb)   Height: 166.4 cm (65.51\")     Body mass index is 34.89 kg/m².    Physical Exam  Constitutional:       Appearance: She is well-developed. She is obese.   Neck:      Thyroid: No thyromegaly.      Vascular: No carotid bruit.      Trachea: Trachea normal.   Cardiovascular:      Rate and Rhythm: Normal rate and regular rhythm.      Heart sounds: Normal heart sounds. No murmur heard.  No friction rub. No gallop.    Pulmonary:      Effort: Pulmonary effort is normal.      Breath sounds: Normal breath sounds. No wheezing or rales.   Abdominal:      General: Bowel sounds are normal.      Palpations: Abdomen is soft.      Tenderness: There is no abdominal tenderness.      Hernia: No hernia is present.   Musculoskeletal:         General: Normal range of motion.      Cervical back: Normal range of motion and neck supple.   Lymphadenopathy:      Cervical: No cervical adenopathy.   Skin:     General: Skin is warm and dry.   Neurological:      Mental Status: She is alert and oriented to person, place, and " time.   Psychiatric:         Behavior: Behavior normal.         Thought Content: Thought content normal.         Judgment: Judgment normal.          Result Review :   The following data was reviewed by: VIRGIL Carlson on 01/06/2022:  CMP    CMP 3/10/21 6/25/21   Glucose  102 (A)   BUN  15   Creatinine 0.70 0.76   eGFR Non  Am  82   eGFR African Am  95   Sodium  142   Potassium  4.6   Chloride  102   Calcium  9.8   Total Protein  6.7   Albumin  4.5   Globulin  2.2   Total Bilirubin  0.4   Alkaline Phosphatase  67   AST (SGOT)  23   ALT (SGPT)  24   (A) Abnormal value       Comments are available for some flowsheets but are not being displayed.           CBC    CBC 6/25/21   WBC 6.4   RBC 4.96   Hemoglobin 14.7   Hematocrit 44.6   MCV 90   MCH 29.6   MCHC 33.0   RDW 13.0   Platelets 301           Lipid Panel    Lipid Panel 6/25/21   Total Cholesterol 233 (A)   Triglycerides 115   HDL Cholesterol 67   VLDL Cholesterol 20   LDL Cholesterol  146 (A)   (A) Abnormal value                        Assessment and Plan    Diagnoses and all orders for this visit:    1. Hypertension, benign (Primary)  Assessment & Plan:  Stable.     Orders:  -     Comprehensive Metabolic Panel  -     CBC & Differential    2. Mixed hyperlipidemia  -     Comprehensive Metabolic Panel  -     Lipid Panel    3. Impaired fasting glucose  -     Hemoglobin A1c    4. Diverticulitis  Assessment & Plan:  Information including in AVS.         5. Tubular adenoma of colon  Assessment & Plan:  Encouraged her to get colonoscopy scheduled.       6. Lumbar radiculopathy  Assessment & Plan:  Continue exercise and weight loss.   Report any worsening.       7. Class 1 obesity due to excess calories with serious comorbidity and body mass index (BMI) of 34.0 to 34.9 in adult  Assessment & Plan:  Congratulated on her weight loss.   Continue healthy eating and regular exercise.       Other orders  -     Enalapril-hydroCHLOROthiazide 5-12.5 MG per tablet; Take  1 tablet by mouth Daily.  Dispense: 90 tablet; Refill: 0          Follow Up   Return in about 24 weeks (around 6/23/2022) for Medicare Wellness.    Patient was given instructions and counseling regarding her condition and health maintenance advice. Please see specific information in the After Visit Summary.     Bárbara Marie, APRN 1/6/2022 10:52 EST  This note was electronically signed.

## 2022-02-01 RX ORDER — ALBUTEROL SULFATE 90 UG/1
2 AEROSOL, METERED RESPIRATORY (INHALATION) EVERY 4 HOURS PRN
Qty: 18 G | Refills: 0 | Status: SHIPPED | OUTPATIENT
Start: 2022-02-01 | End: 2022-07-06 | Stop reason: SDUPTHER

## 2022-02-01 RX ORDER — ENALAPRIL MALEATE AND HYDROCHLOROTHIAZIDE 5; 12.5 MG/1; MG/1
1 TABLET ORAL DAILY
Qty: 90 TABLET | Refills: 1 | Status: SHIPPED | OUTPATIENT
Start: 2022-02-01 | End: 2022-07-06 | Stop reason: SDUPTHER

## 2022-02-01 RX ORDER — EPINEPHRINE 0.3 MG/.3ML
0.3 INJECTION SUBCUTANEOUS AS NEEDED
Qty: 2 EACH | Refills: 0 | Status: SHIPPED | OUTPATIENT
Start: 2022-02-01

## 2022-03-11 ENCOUNTER — TELEPHONE (OUTPATIENT)
Dept: FAMILY MEDICINE CLINIC | Facility: CLINIC | Age: 67
End: 2022-03-11

## 2022-03-11 NOTE — TELEPHONE ENCOUNTER
Patient will have the labs done soon,  She wanted to know if you would want her to do a UA also?    Please advise

## 2022-07-06 ENCOUNTER — OFFICE VISIT (OUTPATIENT)
Dept: FAMILY MEDICINE CLINIC | Facility: CLINIC | Age: 67
End: 2022-07-06

## 2022-07-06 VITALS
OXYGEN SATURATION: 97 % | SYSTOLIC BLOOD PRESSURE: 156 MMHG | BODY MASS INDEX: 33.59 KG/M2 | WEIGHT: 209 LBS | DIASTOLIC BLOOD PRESSURE: 84 MMHG | RESPIRATION RATE: 16 BRPM | HEIGHT: 66 IN | HEART RATE: 85 BPM | TEMPERATURE: 97.5 F

## 2022-07-06 DIAGNOSIS — E66.09 CLASS 1 OBESITY DUE TO EXCESS CALORIES WITH SERIOUS COMORBIDITY AND BODY MASS INDEX (BMI) OF 34.0 TO 34.9 IN ADULT: ICD-10-CM

## 2022-07-06 DIAGNOSIS — J30.9 ALLERGIC RHINITIS, UNSPECIFIED SEASONALITY, UNSPECIFIED TRIGGER: ICD-10-CM

## 2022-07-06 DIAGNOSIS — Z78.0 MENOPAUSE: ICD-10-CM

## 2022-07-06 DIAGNOSIS — Z12.31 SCREENING MAMMOGRAM, ENCOUNTER FOR: ICD-10-CM

## 2022-07-06 DIAGNOSIS — H90.11 CONDUCTIVE HEARING LOSS OF RIGHT EAR, UNSPECIFIED HEARING STATUS ON CONTRALATERAL SIDE: ICD-10-CM

## 2022-07-06 DIAGNOSIS — Z86.69 HISTORY OF GUILLAIN-BARRE SYNDROME: ICD-10-CM

## 2022-07-06 DIAGNOSIS — D12.6 TUBULAR ADENOMA OF COLON: ICD-10-CM

## 2022-07-06 DIAGNOSIS — Z00.00 MEDICARE ANNUAL WELLNESS VISIT, SUBSEQUENT: Primary | ICD-10-CM

## 2022-07-06 DIAGNOSIS — J45.20 MILD INTERMITTENT ASTHMA WITHOUT COMPLICATION: ICD-10-CM

## 2022-07-06 DIAGNOSIS — I10 HYPERTENSION, BENIGN: ICD-10-CM

## 2022-07-06 DIAGNOSIS — R53.83 OTHER FATIGUE: ICD-10-CM

## 2022-07-06 DIAGNOSIS — E78.2 MIXED HYPERLIPIDEMIA: ICD-10-CM

## 2022-07-06 DIAGNOSIS — G43.709 CHRONIC MIGRAINE WITHOUT AURA WITHOUT STATUS MIGRAINOSUS, NOT INTRACTABLE: ICD-10-CM

## 2022-07-06 DIAGNOSIS — Z12.11 SCREENING FOR COLON CANCER: ICD-10-CM

## 2022-07-06 DIAGNOSIS — E66.01 CLASS 2 SEVERE OBESITY DUE TO EXCESS CALORIES WITH SERIOUS COMORBIDITY AND BODY MASS INDEX (BMI) OF 36.0 TO 36.9 IN ADULT: ICD-10-CM

## 2022-07-06 DIAGNOSIS — Z87.891 HISTORY OF TOBACCO USE: ICD-10-CM

## 2022-07-06 PROBLEM — M17.0 PRIMARY OSTEOARTHRITIS OF BOTH KNEES: Status: ACTIVE | Noted: 2022-07-06

## 2022-07-06 PROCEDURE — 1170F FXNL STATUS ASSESSED: CPT | Performed by: NURSE PRACTITIONER

## 2022-07-06 PROCEDURE — 1126F AMNT PAIN NOTED NONE PRSNT: CPT | Performed by: NURSE PRACTITIONER

## 2022-07-06 PROCEDURE — 1159F MED LIST DOCD IN RCRD: CPT | Performed by: NURSE PRACTITIONER

## 2022-07-06 PROCEDURE — 99214 OFFICE O/P EST MOD 30 MIN: CPT | Performed by: NURSE PRACTITIONER

## 2022-07-06 PROCEDURE — G0438 PPPS, INITIAL VISIT: HCPCS | Performed by: NURSE PRACTITIONER

## 2022-07-06 RX ORDER — ALBUTEROL SULFATE 90 UG/1
2 AEROSOL, METERED RESPIRATORY (INHALATION) EVERY 4 HOURS PRN
Qty: 18 G | Refills: 3 | Status: SHIPPED | OUTPATIENT
Start: 2022-07-06

## 2022-07-06 RX ORDER — ENALAPRIL MALEATE AND HYDROCHLOROTHIAZIDE 5; 12.5 MG/1; MG/1
1 TABLET ORAL DAILY
Qty: 90 TABLET | Refills: 1 | Status: SHIPPED | OUTPATIENT
Start: 2022-07-06 | End: 2023-01-10

## 2022-07-06 RX ORDER — CYCLOBENZAPRINE HCL 10 MG
10 TABLET ORAL
COMMUNITY
Start: 2022-03-29 | End: 2023-02-02

## 2022-07-06 NOTE — ASSESSMENT & PLAN NOTE
Patient's (Body mass index is 33.73 kg/m².) indicates that they are obese (BMI >30) with health conditions that include hypertension . Weight is unchanged. BMI is is above average; BMI management plan is completed. We discussed portion control and increasing exercise.

## 2022-07-06 NOTE — PROGRESS NOTES
The ABCs of the Annual Wellness Visit  Subsequent Medicare Wellness Visit    Chief Complaint   Patient presents with   • Medicare Wellness-subsequent   • Hypertension       Subjective   History of Present Illness:  Alla Cole is a 67 y.o. female who presents for a Subsequent Medicare Wellness Visit.    Patient is here to establish care from her prior provider off boarding related to [facility].    Patient's primary concern right now is related to her knee pain.  She recently received injection from orthopedic doctor and felt some better.  She is however aware she has degenerative joint disease in her bilateral knees and may need additional injections or joint replacement.    Patient has somewhat elevated blood pressure today.  She reports she normally has that when she is anxious.  We reviewed her entire medical history today and her blood pressure did not improve.  She is compliant with her medication and will continue to monitor her blood pressure and return if it does not improve    Patient is due for a mammogram and overdue for colonoscopy.  She has a history of diverticulitis twice from diverticulosis.  She has been ordered both mammogram and colonoscopy today.    Patient keeps an EpiPen after an anaphylactic reaction several years ago.  It is current would continue.    She has questions about vitamins and supplements today and we have reviewed those in detail.    Patient has a history of allergies and does not feel good on Singulair.  She is taking Claritin but does not seem to be helping completely.  Discussed switching to Allegra.    Patient is currently unable to exercise secondary to her knee but is hoping to progress at that point soon.    Patient is due for labs.  Otherwise is doing well.    Declined vaccine secondary to Konstantin Barré as a teenager.        Alla Cole  has a past medical history of Allergic (juvenile), Allergic rhinitis, Anaphylaxis, Arthritis (2020), Asthma, Benign essential  microscopic hematuria (2020), Chronic sinusitis, Colon polyp, Diverticulitis (03/10/2021), Diverticulosis ( & ), GERD (gastroesophageal reflux disease), Hearing loss, Hypertension, Impaired fasting glucose, Microscopic hematuria, Migraine headache, Rosacea (2020), Tubular adenoma of colon (2021), and Vitreous degeneration.      HEALTH RISK ASSESSMENT    Recent Hospitalizations:  No hospitalization(s) within the last year.    Current Medical Providers:  Patient Care Team:  Ginette Fisher APRN as PCP - General (Nurse Practitioner)  Severtson, Mark A, MD as Consulting Physician (Otolaryngology)    Smoking Status:  Social History     Tobacco Use   Smoking Status Light Tobacco Smoker   • Packs/day: 0.25   • Years: 5.00   • Pack years: 1.25   • Types: Cigarettes, Cigarettes   • Last attempt to quit: 1980   • Years since quittin.5   Smokeless Tobacco Never Used       Alcohol Consumption:  Social History     Substance and Sexual Activity   Alcohol Use Never       Depression Screen:   PHQ-2/PHQ-9 Depression Screening 2022   Retired PHQ-9 Total Score -   Retired Total Score -   Little Interest or Pleasure in Doing Things 0-->not at all   Feeling Down, Depressed or Hopeless 0-->not at all   PHQ-9: Brief Depression Severity Measure Score 0       Fall Risk Screen:  [place] Fall Risk Assessment was completed, and patient is at LOW risk for falls.Assessment completed on:2022    Health Habits and Functional and Cognitive Screening:  Functional & Cognitive Status 2022   Do you have difficulty preparing food and eating? No   Do you have difficulty bathing yourself, getting dressed or grooming yourself? No   Do you have difficulty using the toilet? No   Do you have difficulty moving around from place to place? No   Do you have trouble with steps or getting out of a bed or a chair? No   Current Diet Well Balanced Diet   Dental Exam Not up to date   Eye Exam Up to date   Exercise (times  per week) 0 times per week   Current Exercises Include No Regular Exercise   Current Exercise Activities Include -   Do you need help using the phone?  No   Are you deaf or do you have serious difficulty hearing?  Yes   Do you need help with transportation? No   Do you need help shopping? No   Do you need help preparing meals?  No   Do you need help with housework?  No   Do you need help with laundry? No   Do you need help taking your medications? No   Do you need help managing money? No   Do you ever drive or ride in a car without wearing a seat belt? No   Have you felt unusual stress, anger or loneliness in the last month? No   Who do you live with? Spouse   If you need help, do you have trouble finding someone available to you? No   Have you been bothered in the last four weeks by sexual problems? -   Do you have difficulty concentrating, remembering or making decisions? No         Does the patient have evidence of cognitive impairment? No    Asprin use counseling:Does not need ASA but is currently taking (advised patient that ASA is not indicated and patient chooses to stop it)    Age-appropriate Screening Schedule:  Refer to the list below for future screening recommendations based on patient's age, sex and/or medical conditions. Orders for these recommended tests are listed in the plan section. The patient has been provided with a written plan.    Health Maintenance   Topic Date Due   • TDAP/TD VACCINES (1 - Tdap) Never done   • ZOSTER VACCINE (1 of 2) Never done   • LIPID PANEL  06/25/2022   • INFLUENZA VACCINE  10/01/2022   • DXA SCAN  06/29/2023   • MAMMOGRAM  06/30/2023          The following portions of the patient's history were reviewed and updated as appropriate: allergies, current medications, past family history, past medical history, past social history, past surgical history and problem list.    Outpatient Medications Prior to Visit   Medication Sig Dispense Refill   • aspirin (aspirin) 81 MG EC  tablet JOSE F LOW DOSE 81 MG TBEC     • cholecalciferol (VITAMIN D3) 25 MCG (1000 UT) tablet Take 4,000 Units by mouth Daily.     • EPINEPHrine (EpiPen 2-Tay) 0.3 MG/0.3ML solution auto-injector injection Inject 0.3 mL into the appropriate muscle as directed by prescriber As Needed (as needed for anaphylaxis). 2 each 0   • fluticasone (VERAMYST) 27.5 MCG/SPRAY nasal spray into the nostril(s) as directed by provider.     • Loratadine 10 MG capsule Take 1 capsule by mouth.     • Multiple Vitamins-Minerals (ZINC PO) Take  by mouth.     • Omega-3 Fatty Acids (FISH OIL) 500 MG capsule capsule 1 capsule Daily.     • Quercetin 250 MG tablet      • Zinc 25 MG tablet Take 25 mg by mouth Daily.     • albuterol sulfate  (90 Base) MCG/ACT inhaler Inhale 2 puffs Every 4 (Four) Hours As Needed for Wheezing or Shortness of Air. 18 g 0   • Enalapril-hydroCHLOROthiazide 5-12.5 MG per tablet Take 1 tablet by mouth Daily. 90 tablet 1   • cyclobenzaprine (FLEXERIL) 10 MG tablet Take 10 mg by mouth every night at bedtime.     • famotidine (PEPCID) 40 MG tablet Take 40 mg by mouth At Night As Needed.     • Multiple Vitamins-Minerals (EMERGEN-C IMMUNE PO)      • ondansetron ODT (Zofran ODT) 4 MG disintegrating tablet Place 1 tablet on the tongue Every 8 (Eight) Hours As Needed for Nausea or Vomiting. 20 tablet 0   • metroNIDAZOLE (Metrogel) 1 % gel Apply  topically to the appropriate area as directed Daily. 60 g 5     No facility-administered medications prior to visit.       Patient Active Problem List   Diagnosis   • Rosacea   • Allergic rhinitis   • Asthma   • Endometrial hyperplasia with atypia   • Gastroesophageal reflux disease   • Hearing loss of right ear   • History of Guillain-[place] syndrome   • History of tobacco use   • Hyperlipidemia   • Hypertension, benign   • Impaired fasting glucose   • Menopause   • Migraine headache   • Osteoarthritis   • Class 1 obesity due to excess calories with serious comorbidity and body  "mass index (BMI) of 34.0 to 34.9 in adult   • Benign essential microscopic hematuria   • Diverticulitis   • Tubular adenoma of colon   • Lumbar radiculopathy   • Primary osteoarthritis of both knees       Advanced Care Planning:  ACP discussion was held with the patient during this visit. Patient has an advance directive in EMR which is still valid.     Review of Systems   Constitutional: Negative for fatigue and fever.   HENT: Negative for ear discharge, sinus pain and sore throat.    Eyes: Negative for visual disturbance.   Respiratory: Negative for cough and shortness of breath.    Cardiovascular: Negative for chest pain and palpitations.   Gastrointestinal: Negative for abdominal pain, constipation, nausea and vomiting.   Endocrine: Negative for polyuria.   Genitourinary: Negative for dysuria and vaginal bleeding.   Musculoskeletal: Positive for arthralgias.   Skin: Negative for rash.   Allergic/Immunologic: Positive for environmental allergies. Negative for food allergies.   Neurological: Negative for dizziness and headaches.   Hematological: Does not bruise/bleed easily.   Psychiatric/Behavioral: Negative for agitation. The patient is not nervous/anxious.        Compared to one year ago, the patient feels her physical health is the same.  Compared to one year ago, the patient feels her mental health is the same.    Reviewed chart for potential of high risk medication in the elderly: yes  Reviewed chart for potential of harmful drug interactions in the elderly:yes    Objective         Vitals:    07/06/22 0855 07/06/22 0930   BP: 156/91 156/84   BP Location: Right arm    Patient Position: Sitting    Cuff Size: Large Adult    Pulse: 85    Resp: 16    Temp: 97.5 °F (36.4 °C)    TempSrc: Infrared    SpO2: 97%    Weight: 94.8 kg (209 lb)    Height: 167.6 cm (66\")    PainSc: 0-No pain        Body mass index is 33.73 kg/m².  Discussed the patient's BMI with her. The BMI is above average; BMI management plan is " completed.    Physical Exam  Vitals and nursing note reviewed.   Constitutional:       Appearance: Normal appearance. She is well-developed.   HENT:      Head: Normocephalic and atraumatic.      Right Ear: Ear canal and external ear normal. No decreased hearing noted. No drainage, swelling or tenderness. Tympanic membrane is scarred.      Left Ear: Tympanic membrane, ear canal and external ear normal. No decreased hearing noted. No drainage, swelling or tenderness.      Nose: Nose normal.      Mouth/Throat:      Mouth: Mucous membranes are moist.      Pharynx: Oropharynx is clear.   Eyes:      General: Lids are normal.      Conjunctiva/sclera: Conjunctivae normal.      Pupils: Pupils are equal, round, and reactive to light.   Neck:      Thyroid: No thyroid mass or thyromegaly.      Vascular: Normal carotid pulses. No carotid bruit.   Cardiovascular:      Rate and Rhythm: Regular rhythm.      Heart sounds: S1 normal and S2 normal. No murmur heard.    No friction rub. No gallop.   Pulmonary:      Effort: Pulmonary effort is normal.      Breath sounds: Normal breath sounds. No wheezing.   Chest:      Chest wall: No tenderness.   Breasts:      Right: Normal. No swelling, bleeding, inverted nipple, mass, nipple discharge, skin change or tenderness.      Left: Normal. No swelling, bleeding, inverted nipple, mass, nipple discharge, skin change or tenderness.       Abdominal:      General: Bowel sounds are normal. There is no distension.      Palpations: Abdomen is soft.      Tenderness: There is no abdominal tenderness.      Hernia: No hernia is present.   Musculoskeletal:         General: Normal range of motion.      Cervical back: Full passive range of motion without pain, normal range of motion and neck supple.   Lymphadenopathy:      Head:      Right side of head: No submental, submandibular, tonsillar, preauricular or posterior auricular adenopathy.      Left side of head: No submental, submandibular, tonsillar,  preauricular or posterior auricular adenopathy.      Cervical: No cervical adenopathy.      Right cervical: No superficial cervical adenopathy.     Left cervical: No superficial cervical adenopathy.   Skin:     General: Skin is warm and dry.   Neurological:      General: No focal deficit present.      Mental Status: She is alert and oriented to person, place, and time.      Cranial Nerves: No cranial nerve deficit.      Sensory: No sensory deficit.      Motor: Motor function is intact.      Coordination: Coordination is intact.      Gait: Gait is intact.      Deep Tendon Reflexes: Reflexes normal.   Psychiatric:         Attention and Perception: She is attentive.         Mood and Affect: Mood normal.         Speech: Speech normal.         Behavior: Behavior normal.         Thought Content: Thought content normal.         Cognition and Memory: Cognition normal.         Judgment: Judgment normal.               Assessment & Plan   Medicare Risks and Personalized Health Plan  CMS Preventative Services Quick Reference  Advance Directive Discussion  Breast Cancer/Mammogram Screening  Colon Cancer Screening  Depression/Dysphoria  Diabetic Lab Screening   Glaucoma Risk  Immunizations Discussed/Encouraged (specific immunizations; Prevnar 20 (Pneumococcal 20-valent conjugate) and Shingrix )  Osteoporosis Risk    The above risks/problems have been discussed with the patient.  Pertinent information has been shared with the patient in the After Visit Summary.  Follow up plans and orders are seen below in the Assessment/Plan Section.    Diagnoses and all orders for this visit:    1. Medicare annual wellness visit, subsequent (Primary)  Comments:  Anticipatory guidance discussed.  Also discussed immunizations.    2. Hypertension, benign  Assessment & Plan:  Somewhat elevated today will monitor and continue current blood pressure medication.  If continues elevation will increase medication.    Orders:  -     Comprehensive Metabolic  Panel    3. Mixed hyperlipidemia  Assessment & Plan:  Fasting labs today will call with results.  Continue omega-3    Orders:  -     Lipid Panel    4. Allergic rhinitis, unspecified seasonality, unspecified trigger  Assessment & Plan:  Currently taking Claritin changed to Allegra.      5. Class 2 severe obesity due to excess calories with serious comorbidity and body mass index (BMI) of 36.0 to 36.9 in adult ([facility])    6. History of Guillain-[place] syndrome  Assessment & Plan:  Declined immunizations.      7. History of tobacco use    8. Menopause  Assessment & Plan:  DEXA scan stable.  We will repeat next year      9. Chronic migraine without aura without status migrainosus, not intractable    10. Screening mammogram, encounter for  -     Mammo Screening Digital Tomosynthesis Bilateral With CAD; Future    11. Other fatigue  -     TSH  -     CBC & Differential  -     Vitamin B12    12. Screening for colon cancer  -     Ambulatory Referral For Screening Colonoscopy    13. Conductive hearing loss of right ear, unspecified hearing status on contralateral side    14. Class 1 obesity due to excess calories with serious comorbidity and body mass index (BMI) of 34.0 to 34.9 in adult  Assessment & Plan:  Patient's (Body mass index is 33.73 kg/m².) indicates that they are obese (BMI >30) with health conditions that include hypertension . Weight is unchanged. BMI is is above average; BMI management plan is completed. We discussed portion control and increasing exercise.       15. Tubular adenoma of colon  Assessment & Plan:  Encouraged to schedule colonoscopy      16. Mild intermittent asthma without complication  Assessment & Plan:  Refilled albuterol.  Uses very seasonally      Other orders  -     Enalapril-hydroCHLOROthiazide 5-12.5 MG per tablet; Take 1 tablet by mouth Daily.  Dispense: 90 tablet; Refill: 1  -     albuterol sulfate  (90 Base) MCG/ACT inhaler; Inhale 2 puffs Every 4 (Four) Hours As Needed for Wheezing  or Shortness of Air.  Dispense: 18 g; Refill: 3    Follow Up:  Return in about 6 months (around 1/6/2023) for Recheck HTN .     An After Visit Summary and PPPS were given to the patient.

## 2022-07-06 NOTE — ASSESSMENT & PLAN NOTE
Somewhat elevated today will monitor and continue current blood pressure medication.  If continues elevation will increase medication.

## 2022-07-07 LAB
ALBUMIN SERPL-MCNC: 4.4 G/DL (ref 3.8–4.8)
ALBUMIN/GLOB SERPL: 2.2 {RATIO} (ref 1.2–2.2)
ALP SERPL-CCNC: 64 IU/L (ref 44–121)
ALT SERPL-CCNC: 22 IU/L (ref 0–32)
AST SERPL-CCNC: 20 IU/L (ref 0–40)
BASOPHILS # BLD AUTO: 0.1 X10E3/UL (ref 0–0.2)
BASOPHILS NFR BLD AUTO: 1 %
BILIRUB SERPL-MCNC: 0.7 MG/DL (ref 0–1.2)
BUN SERPL-MCNC: 15 MG/DL (ref 8–27)
BUN/CREAT SERPL: 21 (ref 12–28)
CALCIUM SERPL-MCNC: 9.6 MG/DL (ref 8.7–10.3)
CHLORIDE SERPL-SCNC: 100 MMOL/L (ref 96–106)
CHOLEST SERPL-MCNC: 258 MG/DL (ref 100–199)
CO2 SERPL-SCNC: 26 MMOL/L (ref 20–29)
CREAT SERPL-MCNC: 0.7 MG/DL (ref 0.57–1)
EGFRCR SERPLBLD CKD-EPI 2021: 95 ML/MIN/1.73
EOSINOPHIL # BLD AUTO: 0.2 X10E3/UL (ref 0–0.4)
EOSINOPHIL NFR BLD AUTO: 3 %
ERYTHROCYTE [DISTWIDTH] IN BLOOD BY AUTOMATED COUNT: 13.2 % (ref 11.7–15.4)
GLOBULIN SER CALC-MCNC: 2 G/DL (ref 1.5–4.5)
GLUCOSE SERPL-MCNC: 95 MG/DL (ref 65–99)
HCT VFR BLD AUTO: 45.3 % (ref 34–46.6)
HDLC SERPL-MCNC: 74 MG/DL
HGB BLD-MCNC: 15.2 G/DL (ref 11.1–15.9)
IMM GRANULOCYTES # BLD AUTO: 0 X10E3/UL (ref 0–0.1)
IMM GRANULOCYTES NFR BLD AUTO: 0 %
LDLC SERPL CALC-MCNC: 167 MG/DL (ref 0–99)
LYMPHOCYTES # BLD AUTO: 1.6 X10E3/UL (ref 0.7–3.1)
LYMPHOCYTES NFR BLD AUTO: 22 %
MCH RBC QN AUTO: 30.5 PG (ref 26.6–33)
MCHC RBC AUTO-ENTMCNC: 33.6 G/DL (ref 31.5–35.7)
MCV RBC AUTO: 91 FL (ref 79–97)
MONOCYTES # BLD AUTO: 0.5 X10E3/UL (ref 0.1–0.9)
MONOCYTES NFR BLD AUTO: 7 %
NEUTROPHILS # BLD AUTO: 4.7 X10E3/UL (ref 1.4–7)
NEUTROPHILS NFR BLD AUTO: 67 %
PLATELET # BLD AUTO: 300 X10E3/UL (ref 150–450)
POTASSIUM SERPL-SCNC: 4.3 MMOL/L (ref 3.5–5.2)
PROT SERPL-MCNC: 6.4 G/DL (ref 6–8.5)
RBC # BLD AUTO: 4.98 X10E6/UL (ref 3.77–5.28)
SODIUM SERPL-SCNC: 140 MMOL/L (ref 134–144)
TRIGL SERPL-MCNC: 98 MG/DL (ref 0–149)
TSH SERPL DL<=0.005 MIU/L-ACNC: 2.85 UIU/ML (ref 0.45–4.5)
VIT B12 SERPL-MCNC: 700 PG/ML (ref 232–1245)
VLDLC SERPL CALC-MCNC: 17 MG/DL (ref 5–40)
WBC # BLD AUTO: 7.1 X10E3/UL (ref 3.4–10.8)

## 2022-07-25 ENCOUNTER — HOSPITAL ENCOUNTER (OUTPATIENT)
Dept: MAMMOGRAPHY | Facility: HOSPITAL | Age: 67
Discharge: HOME | End: 2022-07-25
Admitting: NURSE PRACTITIONER

## 2022-07-25 DIAGNOSIS — Z12.31 SCREENING MAMMOGRAM, ENCOUNTER FOR: ICD-10-CM

## 2022-07-25 PROCEDURE — 77067 SCR MAMMO BI INCL CAD: CPT

## 2022-07-25 PROCEDURE — 77063 BREAST TOMOSYNTHESIS BI: CPT

## 2022-10-07 ENCOUNTER — PATIENT MESSAGE (OUTPATIENT)
Dept: FAMILY MEDICINE CLINIC | Facility: CLINIC | Age: 67
End: 2022-10-07

## 2022-10-07 ENCOUNTER — TELEPHONE (OUTPATIENT)
Dept: FAMILY MEDICINE CLINIC | Facility: CLINIC | Age: 67
End: 2022-10-07

## 2022-10-07 RX ORDER — AZITHROMYCIN 250 MG/1
TABLET, FILM COATED ORAL
Qty: 6 TABLET | Refills: 0 | Status: SHIPPED | OUTPATIENT
Start: 2022-10-07 | End: 2023-02-02

## 2022-10-07 NOTE — TELEPHONE ENCOUNTER
----- Message from Maggie Wilhelm MA sent at 10/7/2022  3:24 PM EDT -----  Regarding: FW: Tested positive for Covid    ----- Message -----  From: Alla Cole  Sent: 10/7/2022   3:22 PM EDT  To: [facility]  Subject: Tested positive for Covid                        Dory Peng,  Starting with sharp headaches on Wednesday evening. Yesterday, fever 100 -101, headaches, chills all day, aching. Continued today with the same. Tested for covid - Positive. Now my right ear is throbbing. I am taking D3, Zinc, Vit. C, tylenol. BP med, Omega 3. Some cough and congestion along with sinus pressure. Any suggestions? Z-pack?  I don't want this to escalate and staying quarantine away from [facility]. He recently had TIA 3 weeks ago. Thanks

## 2022-10-07 NOTE — TELEPHONE ENCOUNTER
I sent her in a Z pack.  She should monitor for sob and oxygen level.  If symptoms worsen be seen over the weekend she needs to be seen.  If she is not better next week call for an appt.

## 2023-01-10 RX ORDER — ENALAPRIL MALEATE AND HYDROCHLOROTHIAZIDE 5; 12.5 MG/1; MG/1
TABLET ORAL
Qty: 90 TABLET | Refills: 0 | Status: SHIPPED | OUTPATIENT
Start: 2023-01-10

## 2023-02-02 ENCOUNTER — HOSPITAL ENCOUNTER (OUTPATIENT)
Dept: GENERAL RADIOLOGY | Facility: HOSPITAL | Age: 68
Discharge: HOME | End: 2023-02-02
Admitting: NURSE PRACTITIONER
Payer: MEDICARE

## 2023-02-02 ENCOUNTER — OFFICE VISIT (OUTPATIENT)
Dept: FAMILY MEDICINE CLINIC | Facility: CLINIC | Age: 68
End: 2023-02-02
Payer: MEDICARE

## 2023-02-02 VITALS
WEIGHT: 202 LBS | BODY MASS INDEX: 32.47 KG/M2 | HEIGHT: 66 IN | RESPIRATION RATE: 16 BRPM | TEMPERATURE: 97.4 F | OXYGEN SATURATION: 98 % | DIASTOLIC BLOOD PRESSURE: 81 MMHG | HEART RATE: 89 BPM | SYSTOLIC BLOOD PRESSURE: 139 MMHG

## 2023-02-02 DIAGNOSIS — J20.9 ACUTE BRONCHITIS, UNSPECIFIED ORGANISM: ICD-10-CM

## 2023-02-02 DIAGNOSIS — B96.89 ACUTE BACTERIAL BRONCHITIS: Primary | ICD-10-CM

## 2023-02-02 DIAGNOSIS — J20.8 ACUTE BACTERIAL BRONCHITIS: Primary | ICD-10-CM

## 2023-02-02 LAB
EXPIRATION DATE: NORMAL
FLUAV AG UPPER RESP QL IA.RAPID: NOT DETECTED
FLUBV AG UPPER RESP QL IA.RAPID: NOT DETECTED
INTERNAL CONTROL: NORMAL
Lab: NORMAL
SARS-COV-2 AG UPPER RESP QL IA.RAPID: NOT DETECTED

## 2023-02-02 PROCEDURE — 87428 SARSCOV & INF VIR A&B AG IA: CPT | Performed by: NURSE PRACTITIONER

## 2023-02-02 PROCEDURE — 96372 THER/PROPH/DIAG INJ SC/IM: CPT | Performed by: NURSE PRACTITIONER

## 2023-02-02 PROCEDURE — 71046 X-RAY EXAM CHEST 2 VIEWS: CPT

## 2023-02-02 PROCEDURE — 99213 OFFICE O/P EST LOW 20 MIN: CPT | Performed by: NURSE PRACTITIONER

## 2023-02-02 RX ORDER — ALBUTEROL SULFATE 1.25 MG/3ML
1 SOLUTION RESPIRATORY (INHALATION) EVERY 6 HOURS PRN
Qty: 60 EACH | Refills: 1 | Status: SHIPPED | OUTPATIENT
Start: 2023-02-02 | End: 2023-02-02

## 2023-02-02 RX ORDER — METHYLPREDNISOLONE SODIUM SUCCINATE 125 MG/2ML
125 INJECTION, POWDER, LYOPHILIZED, FOR SOLUTION INTRAMUSCULAR; INTRAVENOUS ONCE
Status: COMPLETED | OUTPATIENT
Start: 2023-02-02 | End: 2023-02-02

## 2023-02-02 RX ORDER — ALBUTEROL SULFATE 1.25 MG/3ML
1 SOLUTION RESPIRATORY (INHALATION) EVERY 6 HOURS PRN
Qty: 60 EACH | Refills: 1 | Status: SHIPPED | OUTPATIENT
Start: 2023-02-02 | End: 2023-03-29

## 2023-02-02 RX ORDER — CEFDINIR 300 MG/1
300 CAPSULE ORAL 2 TIMES DAILY
Qty: 10 CAPSULE | Refills: 0 | Status: SHIPPED | OUTPATIENT
Start: 2023-02-02 | End: 2023-02-07

## 2023-02-02 RX ORDER — PREDNISONE 20 MG/1
20 TABLET ORAL 2 TIMES DAILY
Qty: 10 TABLET | Refills: 0 | Status: SHIPPED | OUTPATIENT
Start: 2023-02-02 | End: 2023-02-07

## 2023-02-02 RX ORDER — ALBUTEROL SULFATE 1.25 MG/3ML
1 SOLUTION RESPIRATORY (INHALATION) EVERY 6 HOURS PRN
Qty: 60 EACH | Refills: 1 | Status: CANCELLED | OUTPATIENT
Start: 2023-02-02

## 2023-02-02 RX ADMIN — METHYLPREDNISOLONE SODIUM SUCCINATE 125 MG: 125 INJECTION, POWDER, LYOPHILIZED, FOR SOLUTION INTRAMUSCULAR; INTRAVENOUS at 10:12

## 2023-02-02 NOTE — PROGRESS NOTES
Chief Complaint  Cough    Subjective          Alla is a 67 y.o. female  who presents to [facility]     Patient Care Team:  Ginette Fisher APRN as PCP - General (Nurse Practitioner)  Severtson, Mark A, MD as Consulting Physician (Otolaryngology)     History of Present Illness  Keyana is here today for a cough    Location:cough  Quality: productive green sputum  Severity: moderate  Duration: for 4-5 days  Timing: worsening  Context:  She has allergies.  Her  has a sinus infection.  Alleviating factors:  Albuterol inhaler  Aggravating factors: nothing makes worse  Associated Symptoms: no fever, +sore throat earlier this week, + wheezing    She used her albuterol inhaler last night.  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 & 2021), GERD (gastroesophageal reflux disease), Hearing loss, Hypertension, Impaired fasting glucose, Microscopic hematuria, Migraine headache, Rosacea (02/17/2020), Tubular adenoma of colon (03/18/2021), and Vitreous degeneration.      Review of Systems   Constitutional: Positive for fatigue. Negative for fever.   HENT: Positive for congestion, postnasal drip, rhinorrhea, sneezing and sore throat (earlier this week but not now). Negative for ear pain, sinus pressure and sinus pain.    Eyes: Negative for redness and itching.   Respiratory: Positive for cough (productive) and wheezing. Negative for shortness of breath.    Cardiovascular: Negative for chest pain.   Gastrointestinal: Negative for abdominal pain, nausea and vomiting.   Genitourinary: Negative for dysuria, frequency and hematuria.   Musculoskeletal: Positive for back  pain.   Skin: Negative for rash.   Neurological: Negative for dizziness and headaches.        Family History   Problem Relation Age of Onset   • Hypertension Mother    • Thyroid disease Mother    • Alcohol abuse Mother    • Heart disease Mother    • Anxiety disorder Mother    • Arthritis Mother    • Birth defects Mother    • Stroke Father    • Brain cancer Father    • Heart disease Father    • Cancer Father    • Early death Father    • Hyperlipidemia Father    • Thyroid disease Maternal Grandmother    • Stroke Maternal Grandmother    • Stroke Paternal Grandmother         Past Surgical History:   Procedure Laterality Date   • BRAIN SURGERY  See records   • BREAST BIOPSY Right     benign   • CHOLECYSTECTOMY  2014   • COLONOSCOPY  See records   • D & C HYSTEROSCOPY  10/01/2015    Dr. Bell   • DIAGNOSTIC LAPAROSCOPY      Dr. Bell   • HYSTERECTOMY  See records   • HYSTEROSCOPY  10/01/2015    Dr. Bell   • ORIF WRIST FRACTURE Right    • RIGHT OOPHORECTOMY      Dr. Bell   • TOTAL LAPAROSCOPIC HYSTERECTOMY WITH DAVINCI ROBOT  2015    Dr. Bell and Dr. Ott   • TYMPANOPLASTY W/ MASTOIDECTOMY          Social History     Socioeconomic History   • Marital status:    Tobacco Use   • Smoking status: Former     Packs/day: 0.25     Years: 5.00     Pack years: 1.25     Types: Cigarettes     Quit date: 1980     Years since quittin.1     Passive exposure: Past   • Smokeless tobacco: Never   Vaping Use   • Vaping Use: Never used   Substance and Sexual Activity   • Alcohol use: Never   • Drug use: Never        Objective       Current Outpatient Medications:   •  albuterol (ACCUNEB) 1.25 MG/3ML nebulizer solution, Take 3 mL by nebulization Every 6 (Six) Hours As Needed for Wheezing. J45.909, Disp: 60 each, Rfl: 1  •  albuterol sulfate  (90 Base) MCG/ACT inhaler, Inhale 2 puffs Every 4 (Four) Hours As Needed for Wheezing or Shortness of Air., Disp: 18 g, Rfl: 3  •  cholecalciferol (VITAMIN D3) 25  "MCG (1000 UT) tablet, Take 4,000 Units by mouth Daily., Disp: , Rfl:   •  Enalapril-hydroCHLOROthiazide 5-12.5 MG per tablet, Take 1 tablet by mouth once daily, Disp: 90 tablet, Rfl: 0  •  EPINEPHrine (EpiPen 2-Tay) 0.3 MG/0.3ML solution auto-injector injection, Inject 0.3 mL into the appropriate muscle as directed by prescriber As Needed (as needed for anaphylaxis)., Disp: 2 each, Rfl: 0  •  famotidine (PEPCID) 40 MG tablet, Take 40 mg by mouth At Night As Needed., Disp: , Rfl:   •  fluticasone (VERAMYST) 27.5 MCG/SPRAY nasal spray, into the nostril(s) as directed by provider., Disp: , Rfl:   •  Loratadine 10 MG capsule, Take 1 capsule by mouth., Disp: , Rfl:   •  Multiple Vitamins-Minerals (EMERGEN-C IMMUNE PO), , Disp: , Rfl:   •  Multiple Vitamins-Minerals (ZINC PO), Take  by mouth., Disp: , Rfl:   •  Omega-3 Fatty Acids (FISH OIL) 500 MG capsule capsule, 1 capsule Daily., Disp: , Rfl:   •  Quercetin 250 MG tablet, , Disp: , Rfl:   •  Zinc 25 MG tablet, Take 25 mg by mouth Daily., Disp: , Rfl:   •  cefdinir (OMNICEF) 300 MG capsule, Take 1 capsule by mouth 2 (Two) Times a Day for 5 days., Disp: 10 capsule, Rfl: 0  •  predniSONE (DELTASONE) 20 MG tablet, Take 1 tablet by mouth 2 (Two) Times a Day for 5 days., Disp: 10 tablet, Rfl: 0  No current facility-administered medications for this visit.    Vital Signs:      /81 (BP Location: Left arm, Patient Position: Sitting, Cuff Size: Large Adult)   Pulse 89   Temp 97.4 °F (36.3 °C) (Infrared)   Resp 16   Ht 167.6 cm (66\")   Wt 91.6 kg (202 lb)   SpO2 98%   BMI 32.60 kg/m²     Vitals:    02/02/23 0936   BP: 139/81   BP Location: Left arm   Patient Position: Sitting   Cuff Size: Large Adult   Pulse: 89   Resp: 16   Temp: 97.4 °F (36.3 °C)   TempSrc: Infrared   SpO2: 98%   Weight: 91.6 kg (202 lb)   Height: 167.6 cm (66\")      Physical Exam  Vitals reviewed.   Constitutional:       General: She is not in acute distress.     Appearance: Normal appearance. She " is obese. She is not ill-appearing.   HENT:      Head: Normocephalic and atraumatic.      Right Ear: Ear canal and external ear normal. Tympanic membrane is scarred.      Left Ear: Ear canal and external ear normal. A middle ear effusion is present. Tympanic membrane is retracted.      Nose: Congestion and rhinorrhea present.      Mouth/Throat:      Mouth: Mucous membranes are moist.      Pharynx: Oropharynx is clear. Uvula midline. No oropharyngeal exudate.      Comments: Post nasal drainage  Eyes:      General: No scleral icterus.     Conjunctiva/sclera: Conjunctivae normal.   Cardiovascular:      Rate and Rhythm: Normal rate and regular rhythm.      Heart sounds: Normal heart sounds.   Pulmonary:      Effort: Pulmonary effort is normal. No respiratory distress.      Breath sounds: Wheezing and rhonchi present.   Musculoskeletal:      Cervical back: Neck supple.      Right lower leg: No edema.      Left lower leg: No edema.   Lymphadenopathy:      Cervical: No cervical adenopathy.   Skin:     General: Skin is warm and dry.   Neurological:      Mental Status: She is alert and oriented to person, place, and time.   Psychiatric:         Mood and Affect: Mood normal.        Result Review :                Office Visit on 02/02/2023   Component Date Value Ref Range Status   • SARS Antigen 02/02/2023 Not Detected  Not Detected, Presumptive Negative Final   • Influenza A Antigen CRISTIAN 02/02/2023 Not Detected  Not Detected Final   • Influenza B Antigen CRISTIAN 02/02/2023 Not Detected  Not Detected Final   • Internal Control 02/02/2023 Passed  Passed Final   • Lot Number 02/02/2023 [id number]   Final   • Expiration Date 02/02/2023 02/08/2023   Final            Assessment and Plan    Diagnoses and all orders for this visit:    1. Acute bacterial bronchitis (Primary)  -     cefdinir (OMNICEF) 300 MG capsule; Take 1 capsule by mouth 2 (Two) Times a Day for 5 days.  Dispense: 10 capsule; Refill: 0  -     predniSONE (DELTASONE) 20 MG  tablet; Take 1 tablet by mouth 2 (Two) Times a Day for 5 days.  Dispense: 10 tablet; Refill: 0    2. Acute bronchitis, unspecified organism  -     POCT SARS-CoV-2 Antigen CRISTIAN  -     XR Chest PA & Lateral  -     methylPREDNISolone sodium succinate (SOLU-Medrol) injection 125 mg    Other orders  -     Discontinue: albuterol (ACCUNEB) 1.25 MG/3ML nebulizer solution; Take 3 mL by nebulization Every 6 (Six) Hours As Needed for Wheezing.  Dispense: 60 each; Refill: 1  -     albuterol (ACCUNEB) 1.25 MG/3ML nebulizer solution; Take 3 mL by nebulization Every 6 (Six) Hours As Needed for Wheezing. J45.909  Dispense: 60 each; Refill: 1       Obtain chest xray. Begin oral prednisone tomorrow. Will refill albuterol for her nebulizer. Follow-up if not better in 48 - 72 hours or sooner if worse.      Follow Up   Return if symptoms worsen or fail to improve.  Patient was given instructions and counseling regarding her condition or for health maintenance advice. Please see specific information pulled into the AVS if appropriate.    There are no Patient Instructions on file for this visit.

## 2023-03-23 ENCOUNTER — TELEPHONE (OUTPATIENT)
Dept: FAMILY MEDICINE CLINIC | Facility: CLINIC | Age: 68
End: 2023-03-23
Payer: MEDICARE

## 2023-03-23 NOTE — TELEPHONE ENCOUNTER
Please let pt know I have sent in the nystatin swish and swallow mouthwash.  It is 100,000 units in 5 cc.  There is no 1000 units.      I want her to have an appt with me next week.  I want to evaluate this concern and she may need so blood work.  Please make her an appt in one of my open appts.      The rx was sent to [facility].

## 2023-03-23 NOTE — TELEPHONE ENCOUNTER
"Caller: Alla Cole \"Keyana\"    Relationship: Self    Best call back number: [phone]     What medication are you requesting: NYSTATIN 1000 UNITS MOUTH WASH, MEDICATION FOR YEAST INFECTION    What are your current symptoms: THRUSH IN MOUTH, ITCHING IN PELVIC AREA    How long have you been experiencing symptoms: COUPLE OF DAYS    Have you had these symptoms before:    [x] Yes  [] No    Have you been treated for these symptoms before:   [x] Yes  [] No    If a prescription is needed, what is your preferred pharmacy and phone number: [facility]  [place], [place] - [address]  - [phone] [facility] [phone]      Additional notes: PATIENT STATED THIS HAS STARTED AFTER PATIENT HAS HAD A DIVERTICULITIS FLARE UP AND BEING PRESCRIBED STRONG ANTIBIOTICS.     PLEASE CALL TO DISCUSS.            "

## 2023-03-23 NOTE — TELEPHONE ENCOUNTER
Patient was advised on the medication sent it, she has picked it up and will begin using. Also, patient states she was seen at the [facility] recently so tomorrow I will call and get blood work requested as well as a CT scan they completed.

## 2023-03-29 ENCOUNTER — OFFICE VISIT (OUTPATIENT)
Dept: FAMILY MEDICINE CLINIC | Facility: CLINIC | Age: 68
End: 2023-03-29
Payer: MEDICARE

## 2023-03-29 VITALS
SYSTOLIC BLOOD PRESSURE: 142 MMHG | HEIGHT: 66 IN | BODY MASS INDEX: 32.3 KG/M2 | HEART RATE: 84 BPM | OXYGEN SATURATION: 96 % | RESPIRATION RATE: 16 BRPM | DIASTOLIC BLOOD PRESSURE: 80 MMHG | WEIGHT: 201 LBS | TEMPERATURE: 97.3 F

## 2023-03-29 DIAGNOSIS — E66.09 CLASS 1 OBESITY DUE TO EXCESS CALORIES WITH SERIOUS COMORBIDITY AND BODY MASS INDEX (BMI) OF 34.0 TO 34.9 IN ADULT: ICD-10-CM

## 2023-03-29 DIAGNOSIS — I10 HYPERTENSION, BENIGN: ICD-10-CM

## 2023-03-29 DIAGNOSIS — R92.8 OTHER ABNORMAL AND INCONCLUSIVE FINDINGS ON DIAGNOSTIC IMAGING OF BREAST: ICD-10-CM

## 2023-03-29 DIAGNOSIS — N63.0 BREAST NODULE: Primary | ICD-10-CM

## 2023-03-29 DIAGNOSIS — D12.6 TUBULAR ADENOMA OF COLON: ICD-10-CM

## 2023-03-29 DIAGNOSIS — K57.92 DIVERTICULITIS: ICD-10-CM

## 2023-03-29 DIAGNOSIS — Z12.11 SCREENING FOR COLON CANCER: ICD-10-CM

## 2023-03-29 DIAGNOSIS — R73.01 IMPAIRED FASTING GLUCOSE: ICD-10-CM

## 2023-03-29 DIAGNOSIS — R93.3 ABNORMAL FINDINGS ON DIAGNOSTIC IMAGING OF OTHER PARTS OF DIGESTIVE TRACT: ICD-10-CM

## 2023-03-29 RX ORDER — ONDANSETRON 4 MG/1
TABLET, FILM COATED ORAL
COMMUNITY
Start: 2023-03-17

## 2023-03-29 RX ORDER — METRONIDAZOLE 500 MG/1
TABLET ORAL
COMMUNITY
Start: 2023-03-17

## 2023-03-29 RX ORDER — FLUCONAZOLE 100 MG/1
100 TABLET ORAL DAILY
Qty: 3 TABLET | Refills: 0 | Status: SHIPPED | OUTPATIENT
Start: 2023-03-29

## 2023-03-29 RX ORDER — CIPROFLOXACIN 500 MG/1
1 TABLET, FILM COATED ORAL EVERY 12 HOURS SCHEDULED
COMMUNITY
Start: 2023-03-17

## 2023-03-29 NOTE — PROGRESS NOTES
Chief Complaint  Abdominal Pain (Seen at [facility] ER for diverticulitis on 03/16/2023) and Vaginitis (Using Nystatin daily)    Subjective          Alla Cole presents to [facility] for recent ED FU, breast nodule, due for lipids, recent yeast infection and thrush.  Overdue for colonoscopy    History of Present Illness    Patient was seen 2 weeks ago at [facility] for abdominal pain.  She was diagnosed with diverticulitis.  On that CT scan a breast nodule was identified.  She reports she has previously has had a biopsy in that area.  We have discussed doing diagnostic mammogram as well as ultrasound.  She is in agreement believes it is the same nodule.  Patient is much overdue for colonoscopy.  Previously was reminded of that.  After the ER visit she has completed her packet and is getting scheduled for colonoscopy.    After having the antibiotic she developed a thrush infection as well as a yeast infection.  Previously has had some mild elevation in blood sugar.  Discussed rechecking CBC as well as blood sugar today.  Patient still has some vaginal irritation.  Thrush in her mouth is about resolve with the nystatin      Alla Cole  has a past medical history of Allergic (juvenile), Allergic rhinitis, Anaphylaxis, Arthritis (2020), Asthma, Benign essential microscopic hematuria (06/03/2020), Chronic sinusitis, Colon polyp, Diverticulitis (03/10/2021), Diverticulosis (2016? & 2021), GERD (gastroesophageal reflux disease), Hearing loss, Hypertension, Impaired fasting glucose, Microscopic hematuria, Migraine headache, Rosacea (02/17/2020), Tubular adenoma of colon (03/18/2021), and Vitreous degeneration.      Review of Systems   Constitutional: Negative for fatigue and fever.   Respiratory: Negative for cough and shortness of breath.    Gastrointestinal: Negative for abdominal pain, constipation and diarrhea.        Objective       Current Outpatient  Medications:   •  albuterol sulfate  (90 Base) MCG/ACT inhaler, Inhale 2 puffs Every 4 (Four) Hours As Needed for Wheezing or Shortness of Air., Disp: 18 g, Rfl: 3  •  cholecalciferol (VITAMIN D3) 25 MCG (1000 UT) tablet, Take 4 tablets by mouth Daily., Disp: , Rfl:   •  ciprofloxacin (CIPRO) 500 MG tablet, Take 1 tablet by mouth Every 12 (Twelve) Hours., Disp: , Rfl:   •  Enalapril-hydroCHLOROthiazide 5-12.5 MG per tablet, Take 1 tablet by mouth once daily, Disp: 90 tablet, Rfl: 0  •  EPINEPHrine (EpiPen 2-Tay) 0.3 MG/0.3ML solution auto-injector injection, Inject 0.3 mL into the appropriate muscle as directed by prescriber As Needed (as needed for anaphylaxis)., Disp: 2 each, Rfl: 0  •  famotidine (PEPCID) 40 MG tablet, Take 1 tablet by mouth At Night As Needed., Disp: , Rfl:   •  fluticasone (VERAMYST) 27.5 MCG/SPRAY nasal spray, into the nostril(s) as directed by provider., Disp: , Rfl:   •  Loratadine 10 MG capsule, Take 1 capsule by mouth., Disp: , Rfl:   •  metroNIDAZOLE (FLAGYL) 500 MG tablet, TAKE 1 TABLET BY MOUTH EVERY 8 HOURS FOR 10 DAYS, Disp: , Rfl:   •  Multiple Vitamins-Minerals (EMERGEN-C IMMUNE PO), , Disp: , Rfl:   •  Multiple Vitamins-Minerals (ZINC PO), Take  by mouth., Disp: , Rfl:   •  nystatin (MYCOSTATIN) 100,000 unit/mL suspension, Swish and swallow 5 mL 4 (Four) Times a Day., Disp: 473 mL, Rfl: 0  •  Omega-3 Fatty Acids (FISH OIL) 500 MG capsule capsule, 1 capsule Daily., Disp: , Rfl:   •  ondansetron (ZOFRAN) 4 MG tablet, TAKE 1 TABLET BY MOUTH EVERY 4 HOURS AS NEEDED FOR NAUSEA AND VOMITING, Disp: , Rfl:   •  Quercetin 250 MG tablet, , Disp: , Rfl:   •  Zinc 25 MG tablet, Take 25 mg by mouth Daily., Disp: , Rfl:   •  fluconazole (Diflucan) 100 MG tablet, Take 1 tablet by mouth Daily., Disp: 3 tablet, Rfl: 0    Vital Signs:      /80 (BP Location: Right arm, Patient Position: Sitting, Cuff Size: Large Adult)   Pulse 84   Temp 97.3 °F (36.3 °C) (Infrared)   Resp 16   Ht  "167.6 cm (66\")   Wt 91.2 kg (201 lb)   SpO2 96%   BMI 32.44 kg/m²     Vitals:    03/29/23 0834 03/29/23 0844   BP: 158/85 142/80   BP Location: Right arm Right arm   Patient Position: Sitting Sitting   Cuff Size: Large Adult Large Adult   Pulse: 84    Resp: 16    Temp: 97.3 °F (36.3 °C)    TempSrc: Infrared    SpO2: 96%    Weight: 91.2 kg (201 lb)    Height: 167.6 cm (66\")       Physical Exam  Vitals and nursing note reviewed.   Constitutional:       Appearance: She is well-developed.   Cardiovascular:      Rate and Rhythm: Normal rate and regular rhythm.      Heart sounds: Normal heart sounds. No murmur heard.    No friction rub. No gallop.   Pulmonary:      Effort: Pulmonary effort is normal.      Breath sounds: Normal breath sounds. No wheezing or rales.   Abdominal:      General: Bowel sounds are normal.      Palpations: Abdomen is soft.      Tenderness: There is no abdominal tenderness.   Skin:     General: Skin is warm and dry.   Neurological:      Mental Status: She is alert.        Result Review :                  PHQ-9 Total Score: 0   Reviewed ED records test and CT       Assessment and Plan    Diagnoses and all orders for this visit:    1. Breast nodule (Primary)  Comments:  Encouraged to have mammogram and ultrasound  Orders:  -     Mammo Diagnostic Digital Tomosynthesis Right With CAD; Future  -     US Breast Right Limited; Future    2. Diverticulitis  Assessment & Plan:  Resolved after antibiotics.  Scheduling colonoscopy    Orders:  -     CBC & Differential  -     Ambulatory Referral For Screening Colonoscopy    3. Tubular adenoma of colon  Assessment & Plan:  Overdue for colonoscopy.  Patient is completing process to schedule    Orders:  -     Ambulatory Referral For Screening Colonoscopy    4. Hypertension, benign  Assessment & Plan:  Hypertension is improving with treatment.  Continue current treatment regimen.  Blood pressure will be reassessed at the next regular appointment.      5. Class 1 " obesity due to excess calories with serious comorbidity and body mass index (BMI) of 34.0 to 34.9 in adult    6. Impaired fasting glucose  Assessment & Plan:  Rechecking hemoglobin A1c and blood sugar today    Orders:  -     Hemoglobin A1c  -     Lipid Panel With / Chol / HDL Ratio    7. Abnormal findings on diagnostic imaging of other parts of digestive tract  -     Lipid Panel With / Chol / HDL Ratio    8. Screening for colon cancer  -     Ambulatory Referral For Screening Colonoscopy    9. Other abnormal and inconclusive findings on diagnostic imaging of breast  -     Mammo Diagnostic Digital Tomosynthesis Right With CAD; Future  -     US Breast Right Limited; Future    Other orders  -     fluconazole (Diflucan) 100 MG tablet; Take 1 tablet by mouth Daily.  Dispense: 3 tablet; Refill: 0       Problem List Items Addressed This Visit        Active Problems    Hypertension, benign    Current Assessment & Plan     Hypertension is improving with treatment.  Continue current treatment regimen.  Blood pressure will be reassessed at the next regular appointment.         Impaired fasting glucose    Current Assessment & Plan     Rechecking hemoglobin A1c and blood sugar today         Relevant Orders    Hemoglobin A1c    Lipid Panel With / Chol / HDL Ratio    Class 1 obesity due to excess calories with serious comorbidity and body mass index (BMI) of 34.0 to 34.9 in adult    Diverticulitis    Current Assessment & Plan     Resolved after antibiotics.  Scheduling colonoscopy         Relevant Orders    CBC & Differential    Ambulatory Referral For Screening Colonoscopy (Completed)    Tubular adenoma of colon    Current Assessment & Plan     Overdue for colonoscopy.  Patient is completing process to schedule         Relevant Orders    Ambulatory Referral For Screening Colonoscopy (Completed)   Other Visit Diagnoses     Breast nodule    -  Primary    Encouraged to have mammogram and ultrasound    Relevant Orders    Mammo  Diagnostic Digital Tomosynthesis Right With CAD    US Breast Right Limited    Abnormal findings on diagnostic imaging of other parts of digestive tract        Relevant Orders    Lipid Panel With / Chol / HDL Ratio    Screening for colon cancer        Relevant Orders    Ambulatory Referral For Screening Colonoscopy (Completed)    Other abnormal and inconclusive findings on diagnostic imaging of breast        Relevant Orders    Mammo Diagnostic Digital Tomosynthesis Right With CAD    US Breast Right Limited          Follow Up   Return in about 3 months (around 7/10/2023) for Medicare Wellness.  Patient was given instructions and counseling regarding her condition or for health maintenance advice. Please see specific information pulled into the AVS if appropriate.

## 2023-03-30 LAB
BASOPHILS # BLD AUTO: 0.1 X10E3/UL (ref 0–0.2)
BASOPHILS NFR BLD AUTO: 1 %
CHOLEST SERPL-MCNC: 204 MG/DL (ref 100–199)
CHOLEST/HDLC SERPL: 2.6 RATIO (ref 0–4.4)
EOSINOPHIL # BLD AUTO: 0.2 X10E3/UL (ref 0–0.4)
EOSINOPHIL NFR BLD AUTO: 3 %
ERYTHROCYTE [DISTWIDTH] IN BLOOD BY AUTOMATED COUNT: 12.9 % (ref 11.7–15.4)
HBA1C MFR BLD: 5.7 % (ref 4.8–5.6)
HCT VFR BLD AUTO: 43.8 % (ref 34–46.6)
HDLC SERPL-MCNC: 79 MG/DL
HGB BLD-MCNC: 14.9 G/DL (ref 11.1–15.9)
IMM GRANULOCYTES # BLD AUTO: 0 X10E3/UL (ref 0–0.1)
IMM GRANULOCYTES NFR BLD AUTO: 0 %
LDLC SERPL CALC-MCNC: 109 MG/DL (ref 0–99)
LYMPHOCYTES # BLD AUTO: 1.6 X10E3/UL (ref 0.7–3.1)
LYMPHOCYTES NFR BLD AUTO: 24 %
MCH RBC QN AUTO: 30.2 PG (ref 26.6–33)
MCHC RBC AUTO-ENTMCNC: 34 G/DL (ref 31.5–35.7)
MCV RBC AUTO: 89 FL (ref 79–97)
MONOCYTES # BLD AUTO: 0.4 X10E3/UL (ref 0.1–0.9)
MONOCYTES NFR BLD AUTO: 7 %
NEUTROPHILS # BLD AUTO: 4.3 X10E3/UL (ref 1.4–7)
NEUTROPHILS NFR BLD AUTO: 65 %
PLATELET # BLD AUTO: 341 X10E3/UL (ref 150–450)
RBC # BLD AUTO: 4.94 X10E6/UL (ref 3.77–5.28)
TRIGL SERPL-MCNC: 89 MG/DL (ref 0–149)
VLDLC SERPL CALC-MCNC: 16 MG/DL (ref 5–40)
WBC # BLD AUTO: 6.5 X10E3/UL (ref 3.4–10.8)

## 2023-04-24 RX ORDER — ENALAPRIL MALEATE AND HYDROCHLOROTHIAZIDE 5; 12.5 MG/1; MG/1
TABLET ORAL
Qty: 90 TABLET | Refills: 1 | Status: SHIPPED | OUTPATIENT
Start: 2023-04-24

## 2023-05-31 ENCOUNTER — HOSPITAL ENCOUNTER (OUTPATIENT)
Dept: MAMMOGRAPHY | Facility: HOSPITAL | Age: 68
Discharge: HOME | End: 2023-05-31
Payer: MEDICARE

## 2023-05-31 ENCOUNTER — HOSPITAL ENCOUNTER (OUTPATIENT)
Dept: ULTRASOUND IMAGING | Facility: HOSPITAL | Age: 68
Discharge: HOME | End: 2023-05-31
Payer: MEDICARE

## 2023-05-31 ENCOUNTER — APPOINTMENT (OUTPATIENT)
Dept: OTHER | Facility: HOSPITAL | Age: 68
End: 2023-05-31
Payer: MEDICARE

## 2023-05-31 DIAGNOSIS — R92.8 OTHER ABNORMAL AND INCONCLUSIVE FINDINGS ON DIAGNOSTIC IMAGING OF BREAST: ICD-10-CM

## 2023-05-31 DIAGNOSIS — Z09 FOLLOW-UP EXAM: ICD-10-CM

## 2023-05-31 DIAGNOSIS — N63.0 BREAST NODULE: ICD-10-CM

## 2023-05-31 PROCEDURE — G0279 TOMOSYNTHESIS, MAMMO: HCPCS

## 2023-05-31 PROCEDURE — 77066 DX MAMMO INCL CAD BI: CPT

## 2023-07-03 ENCOUNTER — TELEPHONE (OUTPATIENT)
Dept: FAMILY MEDICINE CLINIC | Facility: CLINIC | Age: 68
End: 2023-07-03

## 2023-07-03 NOTE — TELEPHONE ENCOUNTER
"Caller: Alla Cole \"Keyana\"    Relationship: Self    Best call back number: [phone]    What are your current symptoms: LIGHTHEADED, SINUS/EAR CONGESTION, ITCHY EYES    How long have you been experiencing symptoms: 5 DAYS    PATIENT REQUESTING A CALL WITH ADVICE OR MEDICATION CALLED IN    Have you had these symptoms before:    [x] Yes  [] No    Have you been treated for these symptoms before:   [x] Yes  [] No    If a prescription is needed, what is your preferred pharmacy and phone number: [facility] [place] [facility] [address]  - [phone] [facility] [phone]      Additional notes: PATIENT IS HAVING A COLONOSCOPY NEXT WEEK      "

## 2023-07-03 NOTE — TELEPHONE ENCOUNTER
"  Caller: Alla Cole \"Keyana\"    Relationship: Self    Best call back number: [phone]     Who are you requesting to speak with (clinical staff, provider,  specific staff member): CLINICAL STAFF     What was the call regarding: ALSO WANTS TO KNOW IF SHE CAN GET A PRESCRIPTION OF LIDOCAINE CREAM     PLEASE CALL AND ADVISE   "

## 2023-07-03 NOTE — TELEPHONE ENCOUNTER
Please inform patient she needs to be seen.  I do not have any openings currently.  If no one else has openings in the practice which is not likely today, she may need to go to urgent care.    I am not sure what kind of lidocaine cream she is talking about?  We need verification on what she requested.

## 2023-07-12 ENCOUNTER — ON CAMPUS - OUTPATIENT (AMBULATORY)
Dept: URBAN - METROPOLITAN AREA HOSPITAL 2 | Facility: HOSPITAL | Age: 68
End: 2023-07-12

## 2023-07-12 ENCOUNTER — OFFICE (AMBULATORY)
Dept: URBAN - METROPOLITAN AREA PATHOLOGY 4 | Facility: PATHOLOGY | Age: 68
End: 2023-07-12
Payer: COMMERCIAL

## 2023-07-12 ENCOUNTER — OFFICE (AMBULATORY)
Dept: URBAN - METROPOLITAN AREA PATHOLOGY 4 | Facility: PATHOLOGY | Age: 68
End: 2023-07-12

## 2023-07-12 VITALS
SYSTOLIC BLOOD PRESSURE: 116 MMHG | DIASTOLIC BLOOD PRESSURE: 87 MMHG | SYSTOLIC BLOOD PRESSURE: 170 MMHG | DIASTOLIC BLOOD PRESSURE: 53 MMHG | HEART RATE: 89 BPM | OXYGEN SATURATION: 99 % | HEART RATE: 79 BPM | OXYGEN SATURATION: 100 % | HEART RATE: 75 BPM | RESPIRATION RATE: 16 BRPM | HEART RATE: 77 BPM | DIASTOLIC BLOOD PRESSURE: 100 MMHG | DIASTOLIC BLOOD PRESSURE: 64 MMHG | DIASTOLIC BLOOD PRESSURE: 54 MMHG | HEIGHT: 65 IN | RESPIRATION RATE: 18 BRPM | RESPIRATION RATE: 14 BRPM | HEART RATE: 86 BPM | HEART RATE: 105 BPM | SYSTOLIC BLOOD PRESSURE: 110 MMHG | SYSTOLIC BLOOD PRESSURE: 117 MMHG | SYSTOLIC BLOOD PRESSURE: 151 MMHG | DIASTOLIC BLOOD PRESSURE: 66 MMHG | HEART RATE: 73 BPM | TEMPERATURE: 98 F | OXYGEN SATURATION: 94 % | WEIGHT: 193 LBS | HEART RATE: 93 BPM | SYSTOLIC BLOOD PRESSURE: 111 MMHG | SYSTOLIC BLOOD PRESSURE: 122 MMHG | SYSTOLIC BLOOD PRESSURE: 167 MMHG

## 2023-07-12 DIAGNOSIS — Z86.010 PERSONAL HISTORY OF COLONIC POLYPS: ICD-10-CM

## 2023-07-12 DIAGNOSIS — K57.30 DIVERTICULOSIS OF LARGE INTESTINE WITHOUT PERFORATION OR ABS: ICD-10-CM

## 2023-07-12 DIAGNOSIS — K62.1 RECTAL POLYP: ICD-10-CM

## 2023-07-12 LAB
GI HISTOLOGY: A. UNSPECIFIED: (no result)
GI HISTOLOGY: PDF REPORT: (no result)

## 2023-07-12 PROCEDURE — 45385 COLONOSCOPY W/LESION REMOVAL: CPT | Mod: PT | Performed by: INTERNAL MEDICINE

## 2023-07-12 PROCEDURE — 88305 TISSUE EXAM BY PATHOLOGIST: CPT | Mod: 26 | Performed by: INTERNAL MEDICINE

## 2023-08-02 ENCOUNTER — OFFICE VISIT (OUTPATIENT)
Dept: FAMILY MEDICINE CLINIC | Facility: CLINIC | Age: 68
End: 2023-08-02
Payer: MEDICARE

## 2023-08-02 VITALS
BODY MASS INDEX: 31.5 KG/M2 | SYSTOLIC BLOOD PRESSURE: 130 MMHG | TEMPERATURE: 97.4 F | RESPIRATION RATE: 16 BRPM | HEIGHT: 66 IN | OXYGEN SATURATION: 97 % | DIASTOLIC BLOOD PRESSURE: 83 MMHG | WEIGHT: 196 LBS | HEART RATE: 78 BPM

## 2023-08-02 DIAGNOSIS — J45.20 MILD INTERMITTENT ASTHMA WITHOUT COMPLICATION: ICD-10-CM

## 2023-08-02 DIAGNOSIS — Z00.00 MEDICARE ANNUAL WELLNESS VISIT, SUBSEQUENT: Primary | ICD-10-CM

## 2023-08-02 DIAGNOSIS — E78.2 MIXED HYPERLIPIDEMIA: ICD-10-CM

## 2023-08-02 DIAGNOSIS — Z78.0 MENOPAUSE: ICD-10-CM

## 2023-08-02 DIAGNOSIS — Z86.69 HISTORY OF GUILLAIN-BARRE SYNDROME: ICD-10-CM

## 2023-08-02 DIAGNOSIS — Z87.891 HISTORY OF TOBACCO USE: ICD-10-CM

## 2023-08-02 DIAGNOSIS — R73.01 IMPAIRED FASTING GLUCOSE: ICD-10-CM

## 2023-08-02 DIAGNOSIS — I10 HYPERTENSION, BENIGN: ICD-10-CM

## 2023-08-02 DIAGNOSIS — E66.09 CLASS 1 OBESITY DUE TO EXCESS CALORIES WITH SERIOUS COMORBIDITY AND BODY MASS INDEX (BMI) OF 34.0 TO 34.9 IN ADULT: ICD-10-CM

## 2023-08-02 DIAGNOSIS — K63.5 POLYP OF COLON, UNSPECIFIED PART OF COLON, UNSPECIFIED TYPE: ICD-10-CM

## 2023-08-02 DIAGNOSIS — M17.0 PRIMARY OSTEOARTHRITIS OF BOTH KNEES: ICD-10-CM

## 2023-08-02 DIAGNOSIS — H90.11 CONDUCTIVE HEARING LOSS OF RIGHT EAR, UNSPECIFIED HEARING STATUS ON CONTRALATERAL SIDE: ICD-10-CM

## 2023-08-02 DIAGNOSIS — J30.9 ALLERGIC RHINITIS, UNSPECIFIED SEASONALITY, UNSPECIFIED TRIGGER: ICD-10-CM

## 2023-08-02 PROBLEM — K62.1 RECTAL POLYP: Status: ACTIVE | Noted: 2023-07-12

## 2023-08-02 RX ORDER — ENALAPRIL MALEATE AND HYDROCHLOROTHIAZIDE 5; 12.5 MG/1; MG/1
1 TABLET ORAL DAILY
Qty: 90 TABLET | Refills: 2 | Status: SHIPPED | OUTPATIENT
Start: 2023-08-02

## 2023-08-30 ENCOUNTER — HOSPITAL ENCOUNTER (OUTPATIENT)
Dept: BONE DENSITY | Facility: HOSPITAL | Age: 68
Discharge: HOME | End: 2023-08-30
Admitting: NURSE PRACTITIONER
Payer: MEDICARE

## 2023-08-30 DIAGNOSIS — Z78.0 MENOPAUSE: ICD-10-CM

## 2023-08-30 PROCEDURE — 77080 DXA BONE DENSITY AXIAL: CPT

## 2024-01-08 RX ORDER — ENALAPRIL MALEATE AND HYDROCHLOROTHIAZIDE 5; 12.5 MG/1; MG/1
1 TABLET ORAL DAILY
Qty: 90 TABLET | Refills: 0 | Status: SHIPPED | OUTPATIENT
Start: 2024-01-08

## 2024-03-25 ENCOUNTER — TELEPHONE (OUTPATIENT)
Dept: FAMILY MEDICINE CLINIC | Facility: CLINIC | Age: 69
End: 2024-03-25

## 2024-03-25 NOTE — TELEPHONE ENCOUNTER
Left message for to contact office.  Put pt on schedule with Ginette tomorrow 3/26/24 at 3:00 pm.

## 2024-03-25 NOTE — TELEPHONE ENCOUNTER
"Caller: Alla Cole \"Keyana\"    Relationship: Self    Best call back number:[phone]     What medication are you requesting: AUGMENTIN     What are your current symptoms: SINUS INFECTION / EAR PAIN    How long have you been experiencing symptoms: 03/21/24    Have you had these symptoms before:    [x] Yes  [] No    Have you been treated for these symptoms before:   [x] Yes  [] No    If a prescription is needed, what is your preferred pharmacy and phone number: [id number] - [place], [place] - [address] - [phone] [facility] [phone] [facility]     Additional notes:PATIENT STATED THAT SHE WENT TO THE AFTER CARE AND WAS DIAGNOSED WITH A SINUS INFECTION SHE HAS COMPLETED THE Z-PACK THAT WAS PRESCRIBED BUT DIDN'T HELP THE PROVIDER SHE SEEN SUGGESTED THE AUGMENTIN IF SYMPTOMS AREN'T ANY BETTER.         "

## 2024-03-26 ENCOUNTER — OFFICE VISIT (OUTPATIENT)
Dept: FAMILY MEDICINE CLINIC | Facility: CLINIC | Age: 69
End: 2024-03-26
Payer: MEDICARE

## 2024-03-26 VITALS
WEIGHT: 200 LBS | RESPIRATION RATE: 16 BRPM | HEART RATE: 94 BPM | HEIGHT: 66 IN | SYSTOLIC BLOOD PRESSURE: 116 MMHG | TEMPERATURE: 97.8 F | DIASTOLIC BLOOD PRESSURE: 80 MMHG | OXYGEN SATURATION: 96 % | BODY MASS INDEX: 32.14 KG/M2

## 2024-03-26 DIAGNOSIS — H65.01 NON-RECURRENT ACUTE SEROUS OTITIS MEDIA OF RIGHT EAR: Primary | ICD-10-CM

## 2024-03-26 DIAGNOSIS — I10 HYPERTENSION, BENIGN: ICD-10-CM

## 2024-03-26 DIAGNOSIS — J30.9 ALLERGIC RHINITIS, UNSPECIFIED SEASONALITY, UNSPECIFIED TRIGGER: ICD-10-CM

## 2024-03-26 DIAGNOSIS — R73.01 IMPAIRED FASTING GLUCOSE: ICD-10-CM

## 2024-03-26 DIAGNOSIS — E78.2 MIXED HYPERLIPIDEMIA: ICD-10-CM

## 2024-03-26 PROCEDURE — 99213 OFFICE O/P EST LOW 20 MIN: CPT | Performed by: NURSE PRACTITIONER

## 2024-03-26 PROCEDURE — 3074F SYST BP LT 130 MM HG: CPT | Performed by: NURSE PRACTITIONER

## 2024-03-26 PROCEDURE — 1159F MED LIST DOCD IN RCRD: CPT | Performed by: NURSE PRACTITIONER

## 2024-03-26 PROCEDURE — 1160F RVW MEDS BY RX/DR IN RCRD: CPT | Performed by: NURSE PRACTITIONER

## 2024-03-26 PROCEDURE — 3079F DIAST BP 80-89 MM HG: CPT | Performed by: NURSE PRACTITIONER

## 2024-03-26 RX ORDER — AMOXICILLIN AND CLAVULANATE POTASSIUM 875; 125 MG/1; MG/1
1 TABLET, FILM COATED ORAL 2 TIMES DAILY
Qty: 20 TABLET | Refills: 0 | Status: SHIPPED | OUTPATIENT
Start: 2024-03-26 | End: 2024-04-05

## 2024-03-26 RX ORDER — AZITHROMYCIN 250 MG/1
TABLET, FILM COATED ORAL SEE ADMIN INSTRUCTIONS
COMMUNITY
Start: 2024-03-21 | End: 2024-03-26

## 2024-03-26 NOTE — PROGRESS NOTES
Chief Complaint  Sinusitis (Seen at [facility], given Z-pack, no improvement) and Earache    Subjective          Alla Cole presents to [facility] for earache and sinus congestion    History of Present Illness      Patient is here to follow-up on earache and sinus congestion.  She was seen at the [facility] recently.  She had sinus infection symptoms and earache.  She was given a Z-Tay and does not feel that it helped her very much.  She thought she had a fever but that is not documented today.  She has a lot of pain in her right ear which she previously had a surgery.  She is followed with an ENT on a regular basis after surgery she had on that side.  She was to have a MRI or CT to recheck but has not done that yet.  Advised to continue to follow-up with him but we will treat this infection now.  Should she have increasing symptoms mated to need additional workup.    Patient is hypertensive as prediabetes and hyperlipidemia.  She is overdue for for labs.  Prior to this she is having no additional problems.     Alla Cole  has a past medical history of Allergic (juvenile), Allergic rhinitis, Anaphylaxis, Arthritis (2020), Asthma, Benign essential microscopic hematuria (06/03/2020), Chronic sinusitis, Colon polyp, Diverticulitis (03/10/2021), Diverticulosis (2016? & 2021), GERD (gastroesophageal reflux disease), Hearing loss, Hypertension, Impaired fasting glucose, Microscopic hematuria, Migraine headache, Rosacea (02/17/2020), Tubular adenoma of colon (03/18/2021), and Vitreous degeneration.      Review of Systems   Constitutional:  Positive for fatigue. Negative for fever.   HENT:  Positive for ear pain and postnasal drip. Negative for sinus pressure.    Eyes:  Negative for visual disturbance.   Respiratory:  Positive for cough. Negative for shortness of breath.    Cardiovascular:  Negative for chest pain and palpitations.        Objective       Current Outpatient  "Medications:     albuterol sulfate  (90 Base) MCG/ACT inhaler, Inhale 2 puffs Every 4 (Four) Hours As Needed for Wheezing or Shortness of Air., Disp: 18 g, Rfl: 3    cholecalciferol (VITAMIN D3) 25 MCG (1000 UT) tablet, Take 4 tablets by mouth Daily., Disp: , Rfl:     Enalapril-hydroCHLOROthiazide 5-12.5 MG per tablet, TAKE 1 TABLET BY MOUTH EVERY DAY, Disp: 90 tablet, Rfl: 0    EPINEPHrine (EpiPen 2-Tay) 0.3 MG/0.3ML solution auto-injector injection, Inject 0.3 mL into the appropriate muscle as directed by prescriber As Needed (as needed for anaphylaxis)., Disp: 2 each, Rfl: 0    famotidine (PEPCID) 40 MG tablet, Take 1 tablet by mouth At Night As Needed., Disp: , Rfl:     fluticasone (VERAMYST) 27.5 MCG/SPRAY nasal spray, into the nostril(s) as directed by provider., Disp: , Rfl:     lidocaine (XYLOCAINE) 2 % jelly, Apply  topically to the appropriate area as directed As Needed for Mild Pain., Disp: 28.33 g, Rfl: 0    Loratadine 10 MG capsule, Take 1 capsule by mouth., Disp: , Rfl:     Multiple Vitamins-Minerals (EMERGEN-C IMMUNE PO), , Disp: , Rfl:     Multiple Vitamins-Minerals (ZINC PO), Take  by mouth., Disp: , Rfl:     Omega-3 Fatty Acids (FISH OIL) 500 MG capsule capsule, 1 capsule Daily., Disp: , Rfl:     ondansetron (ZOFRAN) 4 MG tablet, TAKE 1 TABLET BY MOUTH EVERY 4 HOURS AS NEEDED FOR NAUSEA AND VOMITING, Disp: , Rfl:     Quercetin 250 MG tablet, , Disp: , Rfl:     Zinc 25 MG tablet, Take 25 mg by mouth Daily., Disp: , Rfl:     amoxicillin-clavulanate (AUGMENTIN) 875-125 MG per tablet, Take 1 tablet by mouth 2 (Two) Times a Day for 10 days., Disp: 20 tablet, Rfl: 0    Vital Signs:      /80 (BP Location: Left arm, Patient Position: Sitting, Cuff Size: Small Adult)   Pulse 94   Temp 97.8 °F (36.6 °C) (Infrared)   Resp 16   Ht 167.6 cm (66\")   Wt 90.7 kg (200 lb)   SpO2 96%   BMI 32.28 kg/m²     Vitals:    03/26/24 1502   BP: 116/80   BP Location: Left arm   Patient Position: Sitting " "  Cuff Size: Small Adult   Pulse: 94   Resp: 16   Temp: 97.8 °F (36.6 °C)   TempSrc: Infrared   SpO2: 96%   Weight: 90.7 kg (200 lb)   Height: 167.6 cm (66\")      Physical Exam  Vitals reviewed.   Constitutional:       General: She is not in acute distress.     Appearance: Normal appearance. She is obese. She is not ill-appearing.   HENT:      Head: Normocephalic and atraumatic.      Right Ear: Ear canal and external ear normal. Tympanic membrane is scarred.      Left Ear: Ear canal and external ear normal. A middle ear effusion is present. Tympanic membrane is erythematous and retracted.      Ears:      Comments: Full air-fluid levels     Nose: Congestion and rhinorrhea present.      Mouth/Throat:      Mouth: Mucous membranes are moist.      Pharynx: Oropharynx is clear. Uvula midline. No oropharyngeal exudate.      Comments: Post nasal drainage  Eyes:      General: No scleral icterus.     Conjunctiva/sclera: Conjunctivae normal.   Cardiovascular:      Rate and Rhythm: Normal rate and regular rhythm.      Heart sounds: Normal heart sounds.   Pulmonary:      Effort: Pulmonary effort is normal. No respiratory distress.      Breath sounds: Wheezing and rhonchi present.   Musculoskeletal:      Cervical back: Neck supple.      Right lower leg: No edema.      Left lower leg: No edema.   Lymphadenopathy:      Cervical: No cervical adenopathy.   Skin:     General: Skin is warm and dry.   Neurological:      Mental Status: She is alert and oriented to person, place, and time.   Psychiatric:         Mood and Affect: Mood normal.        Result Review :                  PHQ-9 Total Score: 0           Assessment and Plan    Diagnoses and all orders for this visit:    1. Non-recurrent acute serous otitis media of right ear (Primary)  Comments:  Cefdinir and Mucinex.  Will follow-up in 2 weeks to recheck ear.  If increasing problems patient should call.  May need to see ENT    2. BMI 32.0-32.9,adult    3. Mixed " hyperlipidemia  Assessment & Plan:   Lipid abnormalities are  needs recheck on lab    .    Orders:  -     Comprehensive Metabolic Panel  -     Lipid Panel    4. Hypertension, benign  Assessment & Plan:  Hypertension is stable and controlled  Continue current treatment regimen.  Blood pressure will be reassessed in 6 months.    Orders:  -     Comprehensive Metabolic Panel  -     Lipid Panel    5. Impaired fasting glucose  Comments:  Needs fasting labs and recheck  Orders:  -     Hemoglobin A1c    6. Allergic rhinitis, unspecified seasonality, unspecified trigger  Comments:  Continue allergy treatment    Other orders  -     amoxicillin-clavulanate (AUGMENTIN) 875-125 MG per tablet; Take 1 tablet by mouth 2 (Two) Times a Day for 10 days.  Dispense: 20 tablet; Refill: 0         Problem List Items Addressed This Visit          Active Problems    Allergic rhinitis    Hyperlipidemia    Current Assessment & Plan      Lipid abnormalities are  needs recheck on lab    .         Relevant Orders    Comprehensive Metabolic Panel    Lipid Panel    Hypertension, benign    Current Assessment & Plan     Hypertension is stable and controlled  Continue current treatment regimen.  Blood pressure will be reassessed in 6 months.         Relevant Orders    Comprehensive Metabolic Panel    Lipid Panel    Impaired fasting glucose    Relevant Orders    Hemoglobin A1c     Other Visit Diagnoses       Non-recurrent acute serous otitis media of right ear    -  Primary    Cefdinir and Mucinex.  Will follow-up in 2 weeks to recheck ear.  If increasing problems patient should call.  May need to see ENT    BMI 32.0-32.9,adult                Follow Up   Return in about 13 days (around 4/8/2024) for Recheck ear and labs and lipids.  Patient was given instructions and counseling regarding her condition or for health maintenance advice. Please see specific information pulled into the AVS if appropriate.

## 2024-03-28 PROBLEM — J06.9 ACUTE URI: Status: ACTIVE | Noted: 2024-03-28

## 2024-04-01 RX ORDER — FLUCONAZOLE 100 MG/1
100 TABLET ORAL DAILY
Qty: 3 TABLET | Refills: 0 | Status: SHIPPED | OUTPATIENT
Start: 2024-04-01 | End: 2024-04-01

## 2024-04-01 RX ORDER — FLUCONAZOLE 100 MG/1
100 TABLET ORAL DAILY
Qty: 3 TABLET | Refills: 0 | Status: SHIPPED | OUTPATIENT
Start: 2024-04-01

## 2024-04-08 ENCOUNTER — TELEPHONE (OUTPATIENT)
Dept: FAMILY MEDICINE CLINIC | Facility: CLINIC | Age: 69
End: 2024-04-08

## 2024-04-08 NOTE — PROGRESS NOTES
Chief Complaint  Ear Fullness and Abdominal Pain    Subjective          Alla Cole presents to [facility] for ear pain and abdominal pain    History of Present Illness    Patient was seen here about 2 weeks ago with a severe ear infection.  She was asked to check back as well as gets fasting labs.  Her ear is much better after taking the antibiotic.  She has been also having yeast infection and took the Diflucan.    Patient has a history of diverticulitis.  She started having symptoms about 2 days ago.  She called the GI doctor yesterday.  She also put herself on clear liquids.  She has left lower quadrant pain with no fever.  She is here today with her .  She feels that she is having a flare and requested CAT scan.  She has no nausea or vomiting.      Alla Cole  has a past medical history of Allergic (juvenile), Allergic rhinitis, Anaphylaxis, Arthritis (2020), Asthma, Benign essential microscopic hematuria (06/03/2020), Chronic sinusitis, Colon polyp, Diverticulitis (03/10/2021), Diverticulosis (2016? & 2021), GERD (gastroesophageal reflux disease), Hearing loss, Hypertension, Impaired fasting glucose, Microscopic hematuria, Migraine headache, Rosacea (02/17/2020), Tubular adenoma of colon (03/18/2021), and Vitreous degeneration.      Review of Systems   Constitutional:  Negative for fatigue and fever.   HENT:  Negative for ear pain.    Cardiovascular:  Negative for chest pain.   Gastrointestinal:  Positive for abdominal pain. Negative for constipation and diarrhea.        Objective       Current Outpatient Medications:     albuterol sulfate  (90 Base) MCG/ACT inhaler, Inhale 2 puffs Every 4 (Four) Hours As Needed for Wheezing or Shortness of Air., Disp: 18 g, Rfl: 3    cholecalciferol (VITAMIN D3) 25 MCG (1000 UT) tablet, Take 4 tablets by mouth Daily., Disp: , Rfl:     Enalapril-hydroCHLOROthiazide 5-12.5 MG per tablet, TAKE 1 TABLET BY MOUTH EVERY  "DAY, Disp: 90 tablet, Rfl: 0    EPINEPHrine (EpiPen 2-Tay) 0.3 MG/0.3ML solution auto-injector injection, Inject 0.3 mL into the appropriate muscle as directed by prescriber As Needed (as needed for anaphylaxis)., Disp: 2 each, Rfl: 0    fluticasone (VERAMYST) 27.5 MCG/SPRAY nasal spray, into the nostril(s) as directed by provider., Disp: , Rfl:     Loratadine 10 MG capsule, Take 1 capsule by mouth., Disp: , Rfl:     Multiple Vitamins-Minerals (EMERGEN-C IMMUNE PO), , Disp: , Rfl:     Multiple Vitamins-Minerals (ZINC PO), Take  by mouth., Disp: , Rfl:     Omega-3 Fatty Acids (FISH OIL) 500 MG capsule capsule, 1 capsule Daily., Disp: , Rfl:     ciprofloxacin (Cipro) 500 MG tablet, Take 1 tablet by mouth 2 (Two) Times a Day for 10 days., Disp: 20 tablet, Rfl: 0    metroNIDAZOLE (Flagyl) 500 MG tablet, Take 1 tablet by mouth 3 (Three) Times a Day., Disp: 30 tablet, Rfl: 0  No current facility-administered medications for this visit.    Vital Signs:      /74 (BP Location: Right arm, Patient Position: Sitting, Cuff Size: Large Adult)   Pulse 92   Temp 97.6 °F (36.4 °C) (Infrared)   Resp 16   Ht 167.6 cm (66\")   Wt 88.5 kg (195 lb)   SpO2 96%   BMI 31.47 kg/m²     Vitals:    04/09/24 0938   BP: 115/74   BP Location: Right arm   Patient Position: Sitting   Cuff Size: Large Adult   Pulse: 92   Resp: 16   Temp: 97.6 °F (36.4 °C)   TempSrc: Infrared   SpO2: 96%   Weight: 88.5 kg (195 lb)   Height: 167.6 cm (66\")      Physical Exam  Vitals and nursing note reviewed.   Constitutional:       General: She is not in acute distress.     Appearance: Normal appearance. She is well-developed and normal weight.   HENT:      Head: Normocephalic and atraumatic.      Right Ear: Tympanic membrane, ear canal and external ear normal. Tympanic membrane is scarred.      Left Ear: Tympanic membrane, ear canal and external ear normal.      Nose: Nose normal.      Mouth/Throat:      Mouth: Mucous membranes are moist.      Dentition: " Normal dentition.      Tongue: No lesions.      Pharynx: Uvula midline.   Eyes:      Conjunctiva/sclera: Conjunctivae normal.      Pupils: Pupils are equal, round, and reactive to light.   Cardiovascular:      Rate and Rhythm: Normal rate and regular rhythm.      Heart sounds: Normal heart sounds. No murmur heard.     No friction rub. No gallop.   Pulmonary:      Effort: Pulmonary effort is normal.      Breath sounds: Normal breath sounds. No wheezing, rhonchi or rales.   Abdominal:      General: Bowel sounds are normal. There is no distension.      Palpations: Abdomen is soft.      Tenderness: There is no abdominal tenderness in the left lower quadrant. There is no guarding or rebound.   Musculoskeletal:         General: Normal range of motion.      Cervical back: Normal range of motion and neck supple.   Skin:     General: Skin is warm and dry.   Neurological:      General: No focal deficit present.      Mental Status: She is alert.   Psychiatric:         Mood and Affect: Mood normal.         Behavior: Behavior normal.        Result Review :                  PHQ-9 Total Score:             Assessment and Plan    Diagnoses and all orders for this visit:    1. Left lower quadrant abdominal pain (Primary)  -     Cancel: CT Abdomen Pelvis With & Without Contrast; Future  -     CBC Auto Differential    2. Mixed hyperlipidemia    3. Hypertension, benign  Assessment & Plan:  Hypertension is stable and controlled  Continue current treatment regimen.  Blood pressure will be reassessed in 6 months.      4. Impaired fasting glucose  Assessment & Plan:  Labs being drawn today      5. BMI 31.0-31.9,adult    6. H/O diverticulitis of colon  -     Cancel: CT Abdomen Pelvis With & Without Contrast; Future  -     CBC Auto Differential    7. Class 1 obesity due to excess calories with serious comorbidity and body mass index (BMI) of 31.0 to 31.9 in adult  Assessment & Plan:  Patient's (Body mass index is 31.47 kg/m².) indicates that  they are obese (BMI >30) with health conditions that include hypertension and dyslipidemias . Weight is improving with lifestyle modifications. BMI  is above average; BMI management plan is completed. We discussed portion control and increasing exercise.       8. Diverticulitis  Assessment & Plan:  Acute flare.  Patient was given antibiotic as well is labs and CT scan done today.      Other orders  -     metroNIDAZOLE (Flagyl) 500 MG tablet; Take 1 tablet by mouth 3 (Three) Times a Day.  Dispense: 30 tablet; Refill: 0  -     ciprofloxacin (Cipro) 500 MG tablet; Take 1 tablet by mouth 2 (Two) Times a Day for 10 days.  Dispense: 20 tablet; Refill: 0         Problem List Items Addressed This Visit          Active Problems    Hyperlipidemia    Hypertension, benign    Current Assessment & Plan     Hypertension is stable and controlled  Continue current treatment regimen.  Blood pressure will be reassessed in 6 months.         Impaired fasting glucose    Current Assessment & Plan     Labs being drawn today         Class 1 obesity due to excess calories with serious comorbidity and body mass index (BMI) of 31.0 to 31.9 in adult    Current Assessment & Plan     Patient's (Body mass index is 31.47 kg/m².) indicates that they are obese (BMI >30) with health conditions that include hypertension and dyslipidemias . Weight is improving with lifestyle modifications. BMI  is above average; BMI management plan is completed. We discussed portion control and increasing exercise.          Diverticulitis    Current Assessment & Plan     Acute flare.  Patient was given antibiotic as well is labs and CT scan done today.         BMI 31.0-31.9,adult     Other Visit Diagnoses       Left lower quadrant abdominal pain    -  Primary    Relevant Orders    CBC Auto Differential    H/O diverticulitis of colon        Relevant Orders    CBC Auto Differential            Follow Up   Return in about 2 weeks (around 4/23/2024) for Recheck  diverticulitis.  Patient was given instructions and counseling regarding her condition or for health maintenance advice. Please see specific information pulled into the AVS if appropriate.

## 2024-04-09 ENCOUNTER — OFFICE VISIT (OUTPATIENT)
Dept: FAMILY MEDICINE CLINIC | Facility: CLINIC | Age: 69
End: 2024-04-09
Payer: MEDICARE

## 2024-04-09 ENCOUNTER — HOSPITAL ENCOUNTER (OUTPATIENT)
Dept: CT IMAGING | Facility: HOSPITAL | Age: 69
Discharge: HOME | End: 2024-04-09
Admitting: NURSE PRACTITIONER
Payer: MEDICARE

## 2024-04-09 ENCOUNTER — TELEPHONE (OUTPATIENT)
Dept: FAMILY MEDICINE CLINIC | Facility: CLINIC | Age: 69
End: 2024-04-09

## 2024-04-09 VITALS
HEART RATE: 92 BPM | OXYGEN SATURATION: 96 % | RESPIRATION RATE: 16 BRPM | WEIGHT: 195 LBS | SYSTOLIC BLOOD PRESSURE: 115 MMHG | BODY MASS INDEX: 31.34 KG/M2 | HEIGHT: 66 IN | TEMPERATURE: 97.6 F | DIASTOLIC BLOOD PRESSURE: 74 MMHG

## 2024-04-09 DIAGNOSIS — R10.32 LEFT LOWER QUADRANT ABDOMINAL PAIN: Primary | ICD-10-CM

## 2024-04-09 DIAGNOSIS — R73.01 IMPAIRED FASTING GLUCOSE: ICD-10-CM

## 2024-04-09 DIAGNOSIS — K57.92 DIVERTICULITIS: ICD-10-CM

## 2024-04-09 DIAGNOSIS — Z87.19 H/O DIVERTICULITIS OF COLON: ICD-10-CM

## 2024-04-09 DIAGNOSIS — E66.09 CLASS 1 OBESITY DUE TO EXCESS CALORIES WITH SERIOUS COMORBIDITY AND BODY MASS INDEX (BMI) OF 31.0 TO 31.9 IN ADULT: ICD-10-CM

## 2024-04-09 DIAGNOSIS — I10 HYPERTENSION, BENIGN: ICD-10-CM

## 2024-04-09 DIAGNOSIS — E78.2 MIXED HYPERLIPIDEMIA: ICD-10-CM

## 2024-04-09 DIAGNOSIS — R10.32 LEFT LOWER QUADRANT ABDOMINAL PAIN: ICD-10-CM

## 2024-04-09 LAB
CREAT BLDA-MCNC: 0.8 MG/DL (ref 0.6–1.3)
EGFRCR SERPLBLD CKD-EPI 2021: 80.4 ML/MIN/1.73

## 2024-04-09 PROCEDURE — 1159F MED LIST DOCD IN RCRD: CPT | Performed by: NURSE PRACTITIONER

## 2024-04-09 PROCEDURE — 3078F DIAST BP <80 MM HG: CPT | Performed by: NURSE PRACTITIONER

## 2024-04-09 PROCEDURE — 1160F RVW MEDS BY RX/DR IN RCRD: CPT | Performed by: NURSE PRACTITIONER

## 2024-04-09 PROCEDURE — 82565 ASSAY OF CREATININE: CPT

## 2024-04-09 PROCEDURE — 25510000001 IOPAMIDOL PER 1 ML: Performed by: NURSE PRACTITIONER

## 2024-04-09 PROCEDURE — 99214 OFFICE O/P EST MOD 30 MIN: CPT | Performed by: NURSE PRACTITIONER

## 2024-04-09 PROCEDURE — 74177 CT ABD & PELVIS W/CONTRAST: CPT

## 2024-04-09 PROCEDURE — 3074F SYST BP LT 130 MM HG: CPT | Performed by: NURSE PRACTITIONER

## 2024-04-09 RX ORDER — METRONIDAZOLE 500 MG/1
500 TABLET ORAL 3 TIMES DAILY
Qty: 30 TABLET | Refills: 0 | Status: SHIPPED | OUTPATIENT
Start: 2024-04-09

## 2024-04-09 RX ORDER — CIPROFLOXACIN 500 MG/1
500 TABLET, FILM COATED ORAL 2 TIMES DAILY
Qty: 20 TABLET | Refills: 0 | Status: SHIPPED | OUTPATIENT
Start: 2024-04-09 | End: 2024-04-19

## 2024-04-09 RX ADMIN — IOPAMIDOL 100 ML: 755 INJECTION, SOLUTION INTRAVENOUS at 11:25

## 2024-04-09 NOTE — TELEPHONE ENCOUNTER
----- Message from Gracie Looney MA sent at 4/9/2024 11:56 AM EDT -----  Patient was advised. She wanted to know when she should start back on solid foods. She has been on liquid diet since Friday.

## 2024-04-09 NOTE — ASSESSMENT & PLAN NOTE
Patient's (Body mass index is 31.47 kg/m².) indicates that they are obese (BMI >30) with health conditions that include hypertension and dyslipidemias . Weight is improving with lifestyle modifications. BMI  is above average; BMI management plan is completed. We discussed portion control and increasing exercise.

## 2024-04-09 NOTE — TELEPHONE ENCOUNTER
I would wait a day or 2 for the antibiotic to take effect.  If she is feeling better by Thursday she can start with a bland diet.

## 2024-04-10 LAB
ALBUMIN SERPL-MCNC: 4.5 G/DL (ref 3.9–4.9)
ALBUMIN/GLOB SERPL: 2 {RATIO} (ref 1.2–2.2)
ALP SERPL-CCNC: 82 IU/L (ref 44–121)
ALT SERPL-CCNC: 18 IU/L (ref 0–32)
AST SERPL-CCNC: 17 IU/L (ref 0–40)
BASOPHILS # BLD AUTO: 0.1 X10E3/UL (ref 0–0.2)
BASOPHILS NFR BLD AUTO: 1 %
BILIRUB SERPL-MCNC: 1 MG/DL (ref 0–1.2)
BUN SERPL-MCNC: 14 MG/DL (ref 8–27)
BUN/CREAT SERPL: 17 (ref 12–28)
CALCIUM SERPL-MCNC: 9.6 MG/DL (ref 8.7–10.3)
CHLORIDE SERPL-SCNC: 97 MMOL/L (ref 96–106)
CHOLEST SERPL-MCNC: 224 MG/DL (ref 100–199)
CO2 SERPL-SCNC: 23 MMOL/L (ref 20–29)
CREAT SERPL-MCNC: 0.83 MG/DL (ref 0.57–1)
EGFRCR SERPLBLD CKD-EPI 2021: 77 ML/MIN/1.73
EOSINOPHIL # BLD AUTO: 0.2 X10E3/UL (ref 0–0.4)
EOSINOPHIL NFR BLD AUTO: 2 %
ERYTHROCYTE [DISTWIDTH] IN BLOOD BY AUTOMATED COUNT: 12.5 % (ref 11.7–15.4)
GLOBULIN SER CALC-MCNC: 2.2 G/DL (ref 1.5–4.5)
GLUCOSE SERPL-MCNC: 102 MG/DL (ref 70–99)
HBA1C MFR BLD: 5.5 % (ref 4.8–5.6)
HCT VFR BLD AUTO: 45.4 % (ref 34–46.6)
HDLC SERPL-MCNC: 84 MG/DL
HGB BLD-MCNC: 15.1 G/DL (ref 11.1–15.9)
IMM GRANULOCYTES # BLD AUTO: 0 X10E3/UL (ref 0–0.1)
IMM GRANULOCYTES NFR BLD AUTO: 0 %
LDLC SERPL CALC-MCNC: 127 MG/DL (ref 0–99)
LYMPHOCYTES # BLD AUTO: 1.7 X10E3/UL (ref 0.7–3.1)
LYMPHOCYTES NFR BLD AUTO: 18 %
MCH RBC QN AUTO: 29.8 PG (ref 26.6–33)
MCHC RBC AUTO-ENTMCNC: 33.3 G/DL (ref 31.5–35.7)
MCV RBC AUTO: 90 FL (ref 79–97)
MONOCYTES # BLD AUTO: 0.6 X10E3/UL (ref 0.1–0.9)
MONOCYTES NFR BLD AUTO: 7 %
NEUTROPHILS # BLD AUTO: 6.8 X10E3/UL (ref 1.4–7)
NEUTROPHILS NFR BLD AUTO: 72 %
PLATELET # BLD AUTO: 339 X10E3/UL (ref 150–450)
POTASSIUM SERPL-SCNC: 4.2 MMOL/L (ref 3.5–5.2)
PROT SERPL-MCNC: 6.7 G/DL (ref 6–8.5)
RBC # BLD AUTO: 5.06 X10E6/UL (ref 3.77–5.28)
SODIUM SERPL-SCNC: 138 MMOL/L (ref 134–144)
TRIGL SERPL-MCNC: 78 MG/DL (ref 0–149)
VLDLC SERPL CALC-MCNC: 13 MG/DL (ref 5–40)
WBC # BLD AUTO: 9.4 X10E3/UL (ref 3.4–10.8)

## 2024-04-23 ENCOUNTER — OFFICE VISIT (OUTPATIENT)
Dept: FAMILY MEDICINE CLINIC | Facility: CLINIC | Age: 69
End: 2024-04-23
Payer: MEDICARE

## 2024-04-23 VITALS
WEIGHT: 201 LBS | HEART RATE: 85 BPM | SYSTOLIC BLOOD PRESSURE: 131 MMHG | OXYGEN SATURATION: 96 % | RESPIRATION RATE: 16 BRPM | TEMPERATURE: 97.6 F | BODY MASS INDEX: 32.3 KG/M2 | DIASTOLIC BLOOD PRESSURE: 85 MMHG | HEIGHT: 66 IN

## 2024-04-23 DIAGNOSIS — B37.0 THRUSH: ICD-10-CM

## 2024-04-23 DIAGNOSIS — K57.92 DIVERTICULITIS: Primary | ICD-10-CM

## 2024-04-23 DIAGNOSIS — K59.00 CONSTIPATION, UNSPECIFIED CONSTIPATION TYPE: ICD-10-CM

## 2024-04-23 PROCEDURE — 1159F MED LIST DOCD IN RCRD: CPT | Performed by: NURSE PRACTITIONER

## 2024-04-23 PROCEDURE — 3079F DIAST BP 80-89 MM HG: CPT | Performed by: NURSE PRACTITIONER

## 2024-04-23 PROCEDURE — 99213 OFFICE O/P EST LOW 20 MIN: CPT | Performed by: NURSE PRACTITIONER

## 2024-04-23 PROCEDURE — 3075F SYST BP GE 130 - 139MM HG: CPT | Performed by: NURSE PRACTITIONER

## 2024-04-23 PROCEDURE — 1160F RVW MEDS BY RX/DR IN RCRD: CPT | Performed by: NURSE PRACTITIONER

## 2024-04-23 NOTE — PROGRESS NOTES
Chief Complaint  Diverticulitis    Subjective          Alla Cole presents to [facility] for follow-up diverticulitis    History of Present Illness      Patient is here to follow-up on acute flare diverticulitis.  Prior to this time she had been without a flare of diverticulitis for about a year.  She is not taking any kind of probiotics or MiraLAX etc. prior to this flare.  She still has about 6 pills of Flagyl left.  Patient is watching her diet.  She has started to have normal stools again.  She has no fever or nausea or vomiting.    Complaining of thrush like symptoms and wanting to take something for yeast infection.  Discussed using nystatin as opposed to the Diflucan.    Discussed constipation and management of bowels.  Alla Cole  has a past medical history of Allergic (juvenile), Allergic rhinitis, Anaphylaxis, Arthritis (2020), Asthma, Benign essential microscopic hematuria (06/03/2020), Chronic sinusitis, Colon polyp, Diverticulitis (03/10/2021), Diverticulosis (2016? & 2021), GERD (gastroesophageal reflux disease), Hearing loss, Hypertension, Impaired fasting glucose, Microscopic hematuria, Migraine headache, Rosacea (02/17/2020), Tubular adenoma of colon (03/18/2021), and Vitreous degeneration.      Review of Systems   Constitutional:  Negative for fatigue and fever.   HENT:  Negative for ear pain.    Cardiovascular:  Negative for chest pain.   Gastrointestinal:  Positive for constipation. Negative for abdominal pain and diarrhea.        Objective       Current Outpatient Medications:     albuterol sulfate  (90 Base) MCG/ACT inhaler, Inhale 2 puffs Every 4 (Four) Hours As Needed for Wheezing or Shortness of Air., Disp: 18 g, Rfl: 3    cholecalciferol (VITAMIN D3) 25 MCG (1000 UT) tablet, Take 4 tablets by mouth Daily., Disp: , Rfl:     Enalapril-hydroCHLOROthiazide 5-12.5 MG per tablet, TAKE 1 TABLET BY MOUTH EVERY DAY, Disp: 90 tablet, Rfl: 0     "EPINEPHrine (EpiPen 2-Tay) 0.3 MG/0.3ML solution auto-injector injection, Inject 0.3 mL into the appropriate muscle as directed by prescriber As Needed (as needed for anaphylaxis)., Disp: 2 each, Rfl: 0    fluticasone (VERAMYST) 27.5 MCG/SPRAY nasal spray, into the nostril(s) as directed by provider., Disp: , Rfl:     Loratadine 10 MG capsule, Take 1 capsule by mouth., Disp: , Rfl:     Multiple Vitamins-Minerals (EMERGEN-C IMMUNE PO), , Disp: , Rfl:     Multiple Vitamins-Minerals (ZINC PO), Take  by mouth., Disp: , Rfl:     Omega-3 Fatty Acids (FISH OIL) 500 MG capsule capsule, 1 capsule Daily., Disp: , Rfl:     metroNIDAZOLE (Flagyl) 500 MG tablet, Take 1 tablet by mouth 3 (Three) Times a Day. (Patient not taking: Reported on 4/23/2024), Disp: 30 tablet, Rfl: 0    nystatin (MYCOSTATIN) 100,000 unit/mL suspension, Swish and swallow 5 mL 4 (Four) Times a Day., Disp: 473 mL, Rfl: 0    Vital Signs:      /85 (BP Location: Left arm, Patient Position: Sitting, Cuff Size: Large Adult)   Pulse 85   Temp 97.6 °F (36.4 °C) (Infrared)   Resp 16   Ht 167.6 cm (66\")   Wt 91.2 kg (201 lb)   SpO2 96%   BMI 32.44 kg/m²     Vitals:    04/23/24 0937   BP: 131/85   BP Location: Left arm   Patient Position: Sitting   Cuff Size: Large Adult   Pulse: 85   Resp: 16   Temp: 97.6 °F (36.4 °C)   TempSrc: Infrared   SpO2: 96%   Weight: 91.2 kg (201 lb)   Height: 167.6 cm (66\")      Physical Exam  Vitals and nursing note reviewed.   Constitutional:       Appearance: She is well-developed. She is obese.   Cardiovascular:      Rate and Rhythm: Normal rate and regular rhythm.      Heart sounds: Normal heart sounds. No murmur heard.     No friction rub. No gallop.   Pulmonary:      Effort: Pulmonary effort is normal.      Breath sounds: Normal breath sounds. No wheezing or rales.   Abdominal:      General: Bowel sounds are normal.      Palpations: Abdomen is soft.      Tenderness: There is no abdominal tenderness.   Skin:     General: " Skin is warm and dry.   Neurological:      Mental Status: She is alert.        Result Review :                  PHQ-9 Total Score:             Assessment and Plan    Diagnoses and all orders for this visit:    1. Diverticulitis (Primary)  Comments:  Finish Flagyl.  Discussed MiraLAX.  Follow-up if not continuing to improve    2. BMI 32.0-32.9,adult    3. Thrush  Comments:  Rx for thrush    4. Constipation, unspecified constipation type  Comments:  miralax otc    Other orders  -     nystatin (MYCOSTATIN) 100,000 unit/mL suspension; Swish and swallow 5 mL 4 (Four) Times a Day.  Dispense: 473 mL; Refill: 0         Problem List Items Addressed This Visit          Active Problems    Diverticulitis - Primary     Other Visit Diagnoses       BMI 32.0-32.9,adult        Thrush        Rx for thrush    Constipation, unspecified constipation type        miralax otc            Follow Up   Return in about 4 months (around 8/26/2024) for Medicare Wellness.  Patient was given instructions and counseling regarding her condition or for health maintenance advice. Please see specific information pulled into the AVS if appropriate.

## 2024-06-07 ENCOUNTER — TRANSCRIBE ORDERS (OUTPATIENT)
Dept: ADMINISTRATIVE | Facility: HOSPITAL | Age: 69
End: 2024-06-07
Payer: MEDICARE

## 2024-06-07 DIAGNOSIS — Z12.31 SCREENING MAMMOGRAM FOR BREAST CANCER: Primary | ICD-10-CM

## 2024-06-19 ENCOUNTER — HOSPITAL ENCOUNTER (OUTPATIENT)
Dept: MAMMOGRAPHY | Facility: HOSPITAL | Age: 69
Discharge: HOME | End: 2024-06-19
Admitting: NURSE PRACTITIONER
Payer: MEDICARE

## 2024-06-19 DIAGNOSIS — Z12.31 SCREENING MAMMOGRAM FOR BREAST CANCER: ICD-10-CM

## 2024-06-19 PROCEDURE — 77063 BREAST TOMOSYNTHESIS BI: CPT

## 2024-06-19 PROCEDURE — 77067 SCR MAMMO BI INCL CAD: CPT

## 2024-07-05 RX ORDER — ENALAPRIL MALEATE AND HYDROCHLOROTHIAZIDE 5; 12.5 MG/1; MG/1
1 TABLET ORAL DAILY
Qty: 90 TABLET | Refills: 1 | Status: SHIPPED | OUTPATIENT
Start: 2024-07-05

## 2024-09-18 ENCOUNTER — OFFICE VISIT (OUTPATIENT)
Dept: FAMILY MEDICINE CLINIC | Facility: CLINIC | Age: 69
End: 2024-09-18
Payer: MEDICARE

## 2024-09-18 VITALS
DIASTOLIC BLOOD PRESSURE: 78 MMHG | RESPIRATION RATE: 16 BRPM | HEIGHT: 66 IN | BODY MASS INDEX: 32.14 KG/M2 | TEMPERATURE: 97.8 F | SYSTOLIC BLOOD PRESSURE: 135 MMHG | HEART RATE: 80 BPM | OXYGEN SATURATION: 97 % | WEIGHT: 200 LBS

## 2024-09-18 DIAGNOSIS — J30.9 ALLERGIC RHINITIS, UNSPECIFIED SEASONALITY, UNSPECIFIED TRIGGER: ICD-10-CM

## 2024-09-18 DIAGNOSIS — Z00.00 MEDICARE ANNUAL WELLNESS VISIT, SUBSEQUENT: Primary | ICD-10-CM

## 2024-09-18 DIAGNOSIS — E66.09 CLASS 1 OBESITY DUE TO EXCESS CALORIES WITH SERIOUS COMORBIDITY AND BODY MASS INDEX (BMI) OF 32.0 TO 32.9 IN ADULT: ICD-10-CM

## 2024-09-18 DIAGNOSIS — K63.5 POLYP OF COLON, UNSPECIFIED PART OF COLON, UNSPECIFIED TYPE: ICD-10-CM

## 2024-09-18 DIAGNOSIS — I10 HYPERTENSION, BENIGN: ICD-10-CM

## 2024-09-18 DIAGNOSIS — R31.1 BENIGN ESSENTIAL MICROSCOPIC HEMATURIA: ICD-10-CM

## 2024-09-18 DIAGNOSIS — M15.9 PRIMARY OSTEOARTHRITIS INVOLVING MULTIPLE JOINTS: ICD-10-CM

## 2024-09-18 DIAGNOSIS — E55.9 VITAMIN D DEFICIENCY: ICD-10-CM

## 2024-09-18 DIAGNOSIS — R73.01 IMPAIRED FASTING GLUCOSE: ICD-10-CM

## 2024-09-18 DIAGNOSIS — H90.11 CONDUCTIVE HEARING LOSS OF RIGHT EAR, UNSPECIFIED HEARING STATUS ON CONTRALATERAL SIDE: ICD-10-CM

## 2024-09-18 DIAGNOSIS — K57.30 DVRTCLOS OF LG INT W/O PERFORATION OR ABSCESS W/O BLEEDING: ICD-10-CM

## 2024-09-18 DIAGNOSIS — Z13.6 SCREENING FOR CARDIOVASCULAR CONDITION: ICD-10-CM

## 2024-09-18 DIAGNOSIS — J45.20 MILD INTERMITTENT ASTHMA WITHOUT COMPLICATION: ICD-10-CM

## 2024-09-18 DIAGNOSIS — Z78.0 MENOPAUSE: ICD-10-CM

## 2024-09-18 DIAGNOSIS — E78.2 MIXED HYPERLIPIDEMIA: ICD-10-CM

## 2024-09-18 RX ORDER — MELOXICAM 15 MG/1
15 TABLET ORAL DAILY
COMMUNITY

## 2024-09-19 PROBLEM — J06.9 ACUTE URI: Status: RESOLVED | Noted: 2024-03-28 | Resolved: 2024-09-19

## 2024-09-19 RX ORDER — EPINEPHRINE 0.3 MG/.3ML
0.3 INJECTION SUBCUTANEOUS AS NEEDED
Qty: 2 EACH | Refills: 0 | Status: SHIPPED | OUTPATIENT
Start: 2024-09-19

## 2024-09-19 RX ORDER — ENALAPRIL MALEATE AND HYDROCHLOROTHIAZIDE 5; 12.5 MG/1; MG/1
1 TABLET ORAL DAILY
Qty: 90 TABLET | Refills: 1 | Status: SHIPPED | OUTPATIENT
Start: 2024-09-19

## 2024-10-03 ENCOUNTER — HOSPITAL ENCOUNTER (OUTPATIENT)
Dept: ULTRASOUND IMAGING | Facility: HOSPITAL | Age: 69
Discharge: HOME | End: 2024-10-03

## 2024-10-03 ENCOUNTER — HOSPITAL ENCOUNTER (OUTPATIENT)
Dept: CT IMAGING | Facility: HOSPITAL | Age: 69
Discharge: HOME | End: 2024-10-03

## 2024-10-03 DIAGNOSIS — Z13.6 SCREENING FOR CARDIOVASCULAR CONDITION: ICD-10-CM

## 2024-10-03 LAB
BH CV LEA LEFT PTA DISTAL PSV: 76.8 CM/S
BH CV LEA RIGHT PTA DISTAL PSV: 85.1 CM/S
BH CV VAS SCREENING CAROTID CCA LEFT: 103.5 CM/SEC
BH CV VAS SCREENING CAROTID CCA RIGHT: 100 CM/SEC
BH CV VAS SCREENING CAROTID ICA LEFT: 102.9 CM/SEC
BH CV VAS SCREENING CAROTID ICA RIGHT: 80.9 CM/SEC
BH CV XLRA MEAS - MID AO DIAM: 2 CM
BH CV XLRA MEAS - PAD LEFT ABI PT: 1.1
BH CV XLRA MEAS - PAD LEFT ARM: 151 MMHG
BH CV XLRA MEAS - PAD LEFT LEG PT: 187 MMHG
BH CV XLRA MEAS - PAD RIGHT ABI PT: 1.1
BH CV XLRA MEAS - PAD RIGHT ARM: 172 MMHG
BH CV XLRA MEAS - PAD RIGHT LEG PT: 190 MMHG
BH CV XLRA MEAS LEFT DIST CCA EDV: 28.6 CM/SEC
BH CV XLRA MEAS LEFT DIST CCA PSV: 103.5 CM/SEC
BH CV XLRA MEAS LEFT ICA/CCA RATIO: 1
BH CV XLRA MEAS LEFT PROX ICA EDV: 25 CM/SEC
BH CV XLRA MEAS LEFT PROX ICA PSV: 102.9 CM/SEC
BH CV XLRA MEAS RIGHT DIST CCA EDV: 22.6 CM/SEC
BH CV XLRA MEAS RIGHT DIST CCA PSV: 100 CM/SEC
BH CV XLRA MEAS RIGHT ICA/CCA RATIO: 0.8
BH CV XLRA MEAS RIGHT PROX ICA EDV: 18.4 CM/SEC
BH CV XLRA MEAS RIGHT PROX ICA PSV: 80.9 CM/SEC

## 2024-10-03 PROCEDURE — 93799 UNLISTED CV SVC/PROCEDURE: CPT

## 2024-10-03 PROCEDURE — 75571 CT HRT W/O DYE W/CA TEST: CPT

## 2024-10-25 RX ORDER — ALBUTEROL SULFATE 90 UG/1
2 INHALANT RESPIRATORY (INHALATION) EVERY 4 HOURS PRN
Qty: 18 G | Refills: 3 | Status: SHIPPED | OUTPATIENT
Start: 2024-10-25

## 2024-11-11 ENCOUNTER — TELEPHONE (OUTPATIENT)
Dept: FAMILY MEDICINE CLINIC | Facility: CLINIC | Age: 69
End: 2024-11-11
Payer: MEDICARE

## 2024-11-23 LAB
25(OH)D3+25(OH)D2 SERPL-MCNC: 37.8 NG/ML (ref 30–100)
ALBUMIN SERPL-MCNC: 4.4 G/DL (ref 3.9–4.9)
ALP SERPL-CCNC: 69 IU/L (ref 44–121)
ALT SERPL-CCNC: 20 IU/L (ref 0–32)
AST SERPL-CCNC: 22 IU/L (ref 0–40)
BILIRUB SERPL-MCNC: 0.6 MG/DL (ref 0–1.2)
BUN SERPL-MCNC: 14 MG/DL (ref 8–27)
BUN/CREAT SERPL: 19 (ref 12–28)
CALCIUM SERPL-MCNC: 9.6 MG/DL (ref 8.7–10.3)
CHLORIDE SERPL-SCNC: 102 MMOL/L (ref 96–106)
CHOLEST SERPL-MCNC: 247 MG/DL (ref 100–199)
CO2 SERPL-SCNC: 26 MMOL/L (ref 20–29)
CREAT SERPL-MCNC: 0.72 MG/DL (ref 0.57–1)
EGFRCR SERPLBLD CKD-EPI 2021: 90 ML/MIN/1.73
GLOBULIN SER CALC-MCNC: 2 G/DL (ref 1.5–4.5)
GLUCOSE SERPL-MCNC: 90 MG/DL (ref 70–99)
HBA1C MFR BLD: 5.5 % (ref 4.8–5.6)
HDLC SERPL-MCNC: 83 MG/DL
LDLC SERPL CALC-MCNC: 146 MG/DL (ref 0–99)
POTASSIUM SERPL-SCNC: 4.5 MMOL/L (ref 3.5–5.2)
PROT SERPL-MCNC: 6.4 G/DL (ref 6–8.5)
SODIUM SERPL-SCNC: 140 MMOL/L (ref 134–144)
TRIGL SERPL-MCNC: 105 MG/DL (ref 0–149)
TSH SERPL DL<=0.005 MIU/L-ACNC: 3.02 UIU/ML (ref 0.45–4.5)
VLDLC SERPL CALC-MCNC: 18 MG/DL (ref 5–40)

## 2024-11-25 ENCOUNTER — TELEPHONE (OUTPATIENT)
Dept: FAMILY MEDICINE CLINIC | Facility: CLINIC | Age: 69
End: 2024-11-25
Payer: MEDICARE

## 2024-11-25 NOTE — TELEPHONE ENCOUNTER
Patient wanted to let you know Dr. Phoenix found Basal Cell Carcinoma on her cheek. She will be getting Mohs procedure done in December.

## 2025-05-30 ENCOUNTER — TELEPHONE (OUTPATIENT)
Dept: FAMILY MEDICINE CLINIC | Facility: CLINIC | Age: 70
End: 2025-05-30

## 2025-05-30 DIAGNOSIS — Z12.31 SCREENING MAMMOGRAM FOR BREAST CANCER: Primary | ICD-10-CM

## 2025-05-30 NOTE — TELEPHONE ENCOUNTER
"    Caller: Alla Cole \"Keyana\"    Relationship: Self    Best call back number: [phone]    What orders are you requesting (i.e. lab or imaging): MAMMOGRAM DUE IN JULY     In what timeframe would the patient need to come in:   BY JULY     Where will you receive your lab/imaging services:   [facility]       Additional notes:   "

## 2025-06-03 ENCOUNTER — TELEPHONE (OUTPATIENT)
Dept: FAMILY MEDICINE CLINIC | Facility: CLINIC | Age: 70
End: 2025-06-03
Payer: MEDICARE

## 2025-06-03 DIAGNOSIS — E78.2 MIXED HYPERLIPIDEMIA: ICD-10-CM

## 2025-06-03 DIAGNOSIS — R73.01 IMPAIRED FASTING GLUCOSE: ICD-10-CM

## 2025-06-10 ENCOUNTER — OFFICE VISIT (OUTPATIENT)
Dept: FAMILY MEDICINE CLINIC | Facility: CLINIC | Age: 70
End: 2025-06-10
Payer: MEDICARE

## 2025-06-10 VITALS
DIASTOLIC BLOOD PRESSURE: 82 MMHG | SYSTOLIC BLOOD PRESSURE: 137 MMHG | HEART RATE: 79 BPM | WEIGHT: 208 LBS | OXYGEN SATURATION: 95 % | TEMPERATURE: 97.3 F | RESPIRATION RATE: 16 BRPM | BODY MASS INDEX: 33.43 KG/M2 | HEIGHT: 66 IN

## 2025-06-10 DIAGNOSIS — J30.9 ALLERGIC RHINITIS, UNSPECIFIED SEASONALITY, UNSPECIFIED TRIGGER: ICD-10-CM

## 2025-06-10 DIAGNOSIS — I10 HYPERTENSION, BENIGN: Primary | ICD-10-CM

## 2025-06-10 DIAGNOSIS — H11.32 CONJUNCTIVAL HEMORRHAGE, LEFT EYE: ICD-10-CM

## 2025-06-10 DIAGNOSIS — R73.01 IMPAIRED FASTING GLUCOSE: ICD-10-CM

## 2025-06-10 DIAGNOSIS — H11.32 SUBCONJUNCTIVAL HEMATOMA, LEFT: ICD-10-CM

## 2025-06-10 DIAGNOSIS — E66.811 CLASS 1 OBESITY DUE TO EXCESS CALORIES WITH SERIOUS COMORBIDITY AND BODY MASS INDEX (BMI) OF 32.0 TO 32.9 IN ADULT: ICD-10-CM

## 2025-06-10 DIAGNOSIS — E78.2 MIXED HYPERLIPIDEMIA: ICD-10-CM

## 2025-06-10 DIAGNOSIS — E66.09 CLASS 1 OBESITY DUE TO EXCESS CALORIES WITH SERIOUS COMORBIDITY AND BODY MASS INDEX (BMI) OF 32.0 TO 32.9 IN ADULT: ICD-10-CM

## 2025-06-10 DIAGNOSIS — H90.11 CONDUCTIVE HEARING LOSS OF RIGHT EAR, UNSPECIFIED HEARING STATUS ON CONTRALATERAL SIDE: ICD-10-CM

## 2025-06-10 NOTE — PROGRESS NOTES
Chief Complaint  Hypertension    Subjective          Alla Cole presents to [facility] for hypertension subconjunctival hemorrhage, allergies    History of Present Illness    Patient is here to follow-up on hypertension.  She is compliant with her medication.  Also is taking omega-3 for her cholesterol.  She is seeing podiatry for foot pain.  She has been taking meloxicam on and off.  Discussed risk and benefits.    Continues to follow with dermatology as well for basal cell monitoring.    Recently has had a subconjunctival hemorrhage.  She has an eye appointment upcoming.  She reports she has had 3 this year.  We have discussed medications as well as labs today.  She will get labs as ordered.    Trying exercise and watch her diet.        Alla Cole  has a past medical history of Allergic (juvenile), Allergic rhinitis, Anaphylaxis, Arthritis (2020), Asthma, Benign essential microscopic hematuria (06/03/2020), Chronic sinusitis, Colon polyp, Diverticulitis (03/10/2021), Diverticulosis (2016? & 2021), GERD (gastroesophageal reflux disease), Hearing loss, Hypertension, Impaired fasting glucose, Microscopic hematuria, Migraine headache, Rosacea (02/17/2020), Tubular adenoma of colon (03/18/2021), and Vitreous degeneration.      Review of Systems   Constitutional:  Negative for fatigue and fever.   Musculoskeletal:  Positive for arthralgias.        Objective       Current Outpatient Medications:     albuterol sulfate  (90 Base) MCG/ACT inhaler, Inhale 2 puffs Every 4 (Four) Hours As Needed for Wheezing or Shortness of Air., Disp: 18 g, Rfl: 3    cholecalciferol (VITAMIN D3) 25 MCG (1000 UT) tablet, Take 4 tablets by mouth Daily., Disp: , Rfl:     Enalapril-hydroCHLOROthiazide 5-12.5 MG per tablet, Take 1 tablet by mouth Daily., Disp: 90 tablet, Rfl: 1    EPINEPHrine (EpiPen 2-Tay) 0.3 MG/0.3ML solution auto-injector injection, Inject 0.3 mL into the appropriate muscle  "as directed by prescriber As Needed (as needed for anaphylaxis)., Disp: 2 each, Rfl: 0    fluticasone (VERAMYST) 27.5 MCG/SPRAY nasal spray, into the nostril(s) as directed by provider., Disp: , Rfl:     Loratadine 10 MG capsule, Take 1 capsule by mouth., Disp: , Rfl:     meloxicam (MOBIC) 15 MG tablet, Take 1 tablet by mouth Daily., Disp: , Rfl:     Multiple Vitamins-Minerals (EMERGEN-C IMMUNE PO), , Disp: , Rfl:     Multiple Vitamins-Minerals (ZINC PO), Take  by mouth., Disp: , Rfl:     Omega-3 Fatty Acids (FISH OIL) 500 MG capsule capsule, 1 capsule Daily., Disp: , Rfl:     Vital Signs:      /82 (BP Location: Right arm, Patient Position: Sitting, Cuff Size: Large Adult)   Pulse 79   Temp 97.3 °F (36.3 °C) (Infrared)   Resp 16   Ht 167.6 cm (66\")   Wt 94.3 kg (208 lb)   SpO2 95%   BMI 33.57 kg/m²     Vitals:    06/10/25 0926 06/10/25 0935   BP: 141/83 137/82   BP Location: Left arm Right arm   Patient Position: Sitting Sitting   Cuff Size: Large Adult Large Adult   Pulse: 79    Resp: 16    Temp: 97.3 °F (36.3 °C)    TempSrc: Infrared    SpO2: 95%    Weight: 94.3 kg (208 lb)    Height: 167.6 cm (66\")       Physical Exam  Vitals and nursing note reviewed.   Constitutional:       Appearance: She is well-developed. She is obese.   HENT:      Head: Normocephalic.      Right Ear: Ear canal and external ear normal. Decreased hearing noted. Tympanic membrane is scarred.      Left Ear: Tympanic membrane, ear canal and external ear normal.      Mouth/Throat:      Pharynx: Postnasal drip present.   Cardiovascular:      Rate and Rhythm: Normal rate and regular rhythm.      Heart sounds: Normal heart sounds. No murmur heard.     No friction rub. No gallop.   Pulmonary:      Effort: Pulmonary effort is normal.      Breath sounds: Normal breath sounds. No wheezing or rales.   Abdominal:      General: Bowel sounds are normal.      Palpations: Abdomen is soft.      Tenderness: There is no abdominal tenderness. "   Skin:     General: Skin is warm and dry.   Neurological:      Mental Status: She is alert.        Result Review :                  Little interest or pleasure in doing things? Not at all   Feeling down, depressed, or hopeless? Not at all   PHQ-2 Total Score 0   Trouble falling or staying asleep, or sleeping too much? Not at all   Feeling tired or having little energy? Not at all   Poor appetite or overeating? Not at all   Feeling bad about yourself - or that you are a failure or have let yourself or your family down? Not at all   Trouble concentrating on things, such as reading the newspaper or watching television? Not at all   Moving or speaking so slowly that other people could have noticed? Or the opposite - being so fidgety or restless that you have been moving around a lot more than usual? Not at all     Thoughts that you would be better off dead, or of hurting yourself in some way? Not at all   PHQ-9 Total Score 0   If you checked off any problems, how difficult have these problems made it for you to do your work, take care of things at home, or get along with other people? Not difficult at all                 Assessment and Plan    Diagnoses and all orders for this visit:    1. Hypertension, benign (Primary)  Assessment & Plan:  Hypertension is stable and controlled  Continue current treatment regimen.  Blood pressure will be reassessed in 6 months.      2. BMI 33.0-33.9,adult    3. Impaired fasting glucose  Assessment & Plan:  Please follow-up labs    Orders:  -     Hemoglobin A1c    4. Mixed hyperlipidemia  Assessment & Plan:   Lipid abnormalities are stable    Plan:  Continue same medication/s without change.      Discussed medication dosage, use, side effects, and goals of treatment in detail.    Counseled patient on lifestyle modifications to help control hyperlipidemia.     Patient Treatment Goals:   LDL goal is less than 70    Followup in 6 months.  Needs fasting labs    Orders:  -     CBC Auto  Differential  -     aPTT  -     Comprehensive Metabolic Panel  -     Lipid Panel  -     Protime-INR    5. Conductive hearing loss of right ear, unspecified hearing status on contralateral side    6. Class 1 obesity due to excess calories with serious comorbidity and body mass index (BMI) of 32.0 to 32.9 in adult  Assessment & Plan:  Patient's (Body mass index is 33.57 kg/m².) indicates that they are obese (BMI >30) with health conditions that include hypertension, impaired fasting glucose, and dyslipidemias . Weight is unchanged. BMI  is above average; BMI management plan is completed. We discussed portion control and increasing exercise.     Orders:  -     CBC Auto Differential  -     aPTT  -     Comprehensive Metabolic Panel  -     Hemoglobin A1c  -     Lipid Panel  -     Protime-INR    7. Subconjunctival hematoma, left  Comments:  Will do labs today continue eye appointment  Orders:  -     CBC Auto Differential  -     Comprehensive Metabolic Panel    8. Conjunctival hemorrhage, left eye  -     aPTT  -     Protime-INR    9. Allergic rhinitis, unspecified seasonality, unspecified trigger  Assessment & Plan:  Over-the-counter medication           Problem List Items Addressed This Visit          Active Problems    Allergic rhinitis    Current Assessment & Plan   Over-the-counter medication         Hearing loss of right ear    Overview   Wears a hearing aid         Hyperlipidemia    Current Assessment & Plan    Lipid abnormalities are stable    Plan:  Continue same medication/s without change.      Discussed medication dosage, use, side effects, and goals of treatment in detail.    Counseled patient on lifestyle modifications to help control hyperlipidemia.     Patient Treatment Goals:   LDL goal is less than 70    Followup in 6 months.  Needs fasting labs         Relevant Orders    CBC Auto Differential    aPTT    Comprehensive Metabolic Panel    Lipid Panel    Protime-INR    Hypertension, benign - Primary    Current  Assessment & Plan   Hypertension is stable and controlled  Continue current treatment regimen.  Blood pressure will be reassessed in 6 months.         Impaired fasting glucose    Current Assessment & Plan   Please follow-up labs         Relevant Orders    Hemoglobin A1c    Class 1 obesity due to excess calories with serious comorbidity and body mass index (BMI) of 32.0 to 32.9 in adult    Current Assessment & Plan   Patient's (Body mass index is 33.57 kg/m².) indicates that they are obese (BMI >30) with health conditions that include hypertension, impaired fasting glucose, and dyslipidemias . Weight is unchanged. BMI  is above average; BMI management plan is completed. We discussed portion control and increasing exercise.          Relevant Orders    CBC Auto Differential    aPTT    Comprehensive Metabolic Panel    Hemoglobin A1c    Lipid Panel    Protime-INR    BMI 33.0-33.9,adult     Other Visit Diagnoses         Subconjunctival hematoma, left        Will do labs today continue eye appointment    Relevant Orders    CBC Auto Differential    Comprehensive Metabolic Panel      Conjunctival hemorrhage, left eye        Relevant Orders    aPTT    Protime-INR            Follow Up   Return in about 6 months (around 12/10/2025) for Medicare Wellness.  Patient was given instructions and counseling regarding her condition or for health maintenance advice. Please see specific information pulled into the AVS if appropriate.

## 2025-06-12 NOTE — ASSESSMENT & PLAN NOTE
Patient's (Body mass index is 33.57 kg/m².) indicates that they are obese (BMI >30) with health conditions that include hypertension, impaired fasting glucose, and dyslipidemias . Weight is unchanged. BMI  is above average; BMI management plan is completed. We discussed portion control and increasing exercise.

## 2025-06-12 NOTE — ASSESSMENT & PLAN NOTE
Lipid abnormalities are stable    Plan:  Continue same medication/s without change.      Discussed medication dosage, use, side effects, and goals of treatment in detail.    Counseled patient on lifestyle modifications to help control hyperlipidemia.     Patient Treatment Goals:   LDL goal is less than 70    Followup in 6 months.  Needs fasting labs

## 2025-06-24 LAB
ALBUMIN SERPL-MCNC: 4.5 G/DL (ref 3.9–4.9)
ALP SERPL-CCNC: 70 IU/L (ref 44–121)
ALT SERPL-CCNC: 26 IU/L (ref 0–32)
APTT PPP: 31 SEC (ref 24–33)
AST SERPL-CCNC: 20 IU/L (ref 0–40)
BASOPHILS # BLD AUTO: 0 X10E3/UL (ref 0–0.2)
BASOPHILS NFR BLD AUTO: 1 %
BILIRUB SERPL-MCNC: 0.7 MG/DL (ref 0–1.2)
BUN SERPL-MCNC: 17 MG/DL (ref 8–27)
BUN/CREAT SERPL: 24 (ref 12–28)
CALCIUM SERPL-MCNC: 9.5 MG/DL (ref 8.7–10.3)
CHLORIDE SERPL-SCNC: 102 MMOL/L (ref 96–106)
CHOLEST SERPL-MCNC: 252 MG/DL (ref 100–199)
CO2 SERPL-SCNC: 24 MMOL/L (ref 20–29)
CREAT SERPL-MCNC: 0.7 MG/DL (ref 0.57–1)
EGFRCR SERPLBLD CKD-EPI 2021: 93 ML/MIN/1.73
EOSINOPHIL # BLD AUTO: 0.2 X10E3/UL (ref 0–0.4)
EOSINOPHIL NFR BLD AUTO: 3 %
ERYTHROCYTE [DISTWIDTH] IN BLOOD BY AUTOMATED COUNT: 13 % (ref 11.7–15.4)
GLOBULIN SER CALC-MCNC: 2.2 G/DL (ref 1.5–4.5)
GLUCOSE SERPL-MCNC: 91 MG/DL (ref 70–99)
HBA1C MFR BLD: 5.5 % (ref 4.8–5.6)
HCT VFR BLD AUTO: 48.4 % (ref 34–46.6)
HDLC SERPL-MCNC: 88 MG/DL
HGB BLD-MCNC: 15.7 G/DL (ref 11.1–15.9)
IMM GRANULOCYTES # BLD AUTO: 0 X10E3/UL (ref 0–0.1)
IMM GRANULOCYTES NFR BLD AUTO: 0 %
INR PPP: 0.9 (ref 0.9–1.2)
LDLC SERPL CALC-MCNC: 148 MG/DL (ref 0–99)
LYMPHOCYTES # BLD AUTO: 1.7 X10E3/UL (ref 0.7–3.1)
LYMPHOCYTES NFR BLD AUTO: 26 %
MCH RBC QN AUTO: 30 PG (ref 26.6–33)
MCHC RBC AUTO-ENTMCNC: 32.4 G/DL (ref 31.5–35.7)
MCV RBC AUTO: 92 FL (ref 79–97)
MONOCYTES # BLD AUTO: 0.5 X10E3/UL (ref 0.1–0.9)
MONOCYTES NFR BLD AUTO: 7 %
NEUTROPHILS # BLD AUTO: 4 X10E3/UL (ref 1.4–7)
NEUTROPHILS NFR BLD AUTO: 63 %
PLATELET # BLD AUTO: 315 X10E3/UL (ref 150–450)
POTASSIUM SERPL-SCNC: 4.7 MMOL/L (ref 3.5–5.2)
PROT SERPL-MCNC: 6.7 G/DL (ref 6–8.5)
PROTHROMBIN TIME: 10.3 SEC (ref 9.1–12)
RBC # BLD AUTO: 5.24 X10E6/UL (ref 3.77–5.28)
SODIUM SERPL-SCNC: 142 MMOL/L (ref 134–144)
TRIGL SERPL-MCNC: 97 MG/DL (ref 0–149)
VLDLC SERPL CALC-MCNC: 16 MG/DL (ref 5–40)
WBC # BLD AUTO: 6.3 X10E3/UL (ref 3.4–10.8)

## 2025-06-24 RX ORDER — ENALAPRIL MALEATE AND HYDROCHLOROTHIAZIDE 5; 12.5 MG/1; MG/1
1 TABLET ORAL DAILY
Qty: 90 TABLET | Refills: 0 | Status: SHIPPED | OUTPATIENT
Start: 2025-06-24

## 2025-06-30 ENCOUNTER — HOSPITAL ENCOUNTER (OUTPATIENT)
Dept: MAMMOGRAPHY | Facility: HOSPITAL | Age: 70
Discharge: HOME | End: 2025-06-30
Admitting: NURSE PRACTITIONER
Payer: MEDICARE

## 2025-06-30 PROCEDURE — 77067 SCR MAMMO BI INCL CAD: CPT

## 2025-06-30 PROCEDURE — 77063 BREAST TOMOSYNTHESIS BI: CPT
